# Patient Record
Sex: FEMALE | Race: WHITE | HISPANIC OR LATINO | Employment: STUDENT | URBAN - METROPOLITAN AREA
[De-identification: names, ages, dates, MRNs, and addresses within clinical notes are randomized per-mention and may not be internally consistent; named-entity substitution may affect disease eponyms.]

---

## 2018-01-24 ENCOUNTER — OFFICE VISIT (OUTPATIENT)
Dept: OBGYN CLINIC | Facility: MEDICAL CENTER | Age: 38
End: 2018-01-24
Payer: COMMERCIAL

## 2018-01-24 VITALS
HEIGHT: 62 IN | SYSTOLIC BLOOD PRESSURE: 122 MMHG | BODY MASS INDEX: 35.48 KG/M2 | DIASTOLIC BLOOD PRESSURE: 78 MMHG | WEIGHT: 192.8 LBS

## 2018-01-24 DIAGNOSIS — Z01.419 ENCOUNTER FOR ROUTINE GYNECOLOGICAL EXAMINATION WITH PAPANICOLAOU SMEAR OF CERVIX: Primary | ICD-10-CM

## 2018-01-24 DIAGNOSIS — Z11.51 SCREENING FOR HUMAN PAPILLOMAVIRUS (HPV): ICD-10-CM

## 2018-01-24 PROCEDURE — G0145 SCR C/V CYTO,THINLAYER,RESCR: HCPCS | Performed by: OBSTETRICS & GYNECOLOGY

## 2018-01-24 PROCEDURE — 87624 HPV HI-RISK TYP POOLED RSLT: CPT | Performed by: OBSTETRICS & GYNECOLOGY

## 2018-01-24 PROCEDURE — 99395 PREV VISIT EST AGE 18-39: CPT | Performed by: OBSTETRICS & GYNECOLOGY

## 2018-01-24 RX ORDER — ALPRAZOLAM 0.25 MG/1
TABLET ORAL
COMMUNITY
Start: 2017-12-11

## 2018-01-24 RX ORDER — IBUPROFEN 800 MG/1
800 TABLET ORAL EVERY 8 HOURS
COMMUNITY
Start: 2017-12-11

## 2018-01-24 RX ORDER — ESCITALOPRAM OXALATE 20 MG/1
20 TABLET ORAL
COMMUNITY
Start: 2017-12-11

## 2018-01-24 NOTE — PROGRESS NOTES
Assessment/Plan:    No problem-specific Assessment & Plan notes found for this encounter  Diagnoses and all orders for this visit:    Encounter for routine gynecological examination with Papanicolaou smear of cervix  -     Liquid-based pap, screening    Screening for human papillomavirus (HPV)  -     Liquid-based pap, screening    Other orders  -     ALPRAZolam (XANAX) 0 25 mg tablet; 1 po bid prn Anxiety  -     escitalopram (LEXAPRO) 20 mg tablet; Take 20 mg by mouth  -     ibuprofen (MOTRIN) 800 mg tablet; Take 800 mg by mouth every 8 (eight) hours          Subjective:      Patient ID: Kamilla Garcia is a 40 y o  female  Here for yearly preventive      Gynecologic Exam         The following portions of the patient's history were reviewed and updated as appropriate: She  has no past medical history on file       Review of Systems   All other systems reviewed and are negative  Objective:     Physical Exam   Constitutional: She appears well-developed and well-nourished  Nursing note and vitals reviewed

## 2018-01-29 LAB — HPV RRNA GENITAL QL NAA+PROBE: NORMAL

## 2018-01-30 LAB
LAB AP GYN PRIMARY INTERPRETATION: NORMAL
LAB AP LMP: NORMAL
Lab: NORMAL

## 2020-10-14 ENCOUNTER — TELEPHONE (OUTPATIENT)
Dept: OBGYN CLINIC | Facility: MEDICAL CENTER | Age: 40
End: 2020-10-14

## 2021-01-06 ENCOUNTER — TELEPHONE (OUTPATIENT)
Dept: OBGYN CLINIC | Facility: MEDICAL CENTER | Age: 41
End: 2021-01-06

## 2021-01-06 DIAGNOSIS — Z12.31 ENCOUNTER FOR SCREENING MAMMOGRAM FOR BREAST CANCER: Primary | ICD-10-CM

## 2021-01-20 ENCOUNTER — ANNUAL EXAM (OUTPATIENT)
Dept: OBGYN CLINIC | Facility: MEDICAL CENTER | Age: 41
End: 2021-01-20
Payer: COMMERCIAL

## 2021-01-20 VITALS
BODY MASS INDEX: 37.1 KG/M2 | SYSTOLIC BLOOD PRESSURE: 113 MMHG | DIASTOLIC BLOOD PRESSURE: 72 MMHG | WEIGHT: 201.6 LBS | HEIGHT: 62 IN

## 2021-01-20 DIAGNOSIS — Z01.419 ROUTINE GYNECOLOGICAL EXAMINATION: Primary | ICD-10-CM

## 2021-01-20 DIAGNOSIS — Z12.4 SCREENING FOR MALIGNANT NEOPLASM OF CERVIX: ICD-10-CM

## 2021-01-20 PROBLEM — E66.3 OVERWEIGHT: Status: ACTIVE | Noted: 2020-11-11

## 2021-01-20 PROBLEM — G89.29 CHRONIC RIGHT SHOULDER PAIN: Status: ACTIVE | Noted: 2020-01-07

## 2021-01-20 PROBLEM — L30.9 ECZEMA OF RIGHT HAND: Status: ACTIVE | Noted: 2018-11-07

## 2021-01-20 PROBLEM — M25.511 CHRONIC RIGHT SHOULDER PAIN: Status: ACTIVE | Noted: 2020-01-07

## 2021-01-20 PROCEDURE — 99396 PREV VISIT EST AGE 40-64: CPT | Performed by: ADVANCED PRACTICE MIDWIFE

## 2021-01-20 PROCEDURE — G0145 SCR C/V CYTO,THINLAYER,RESCR: HCPCS | Performed by: ADVANCED PRACTICE MIDWIFE

## 2021-01-20 PROCEDURE — 87624 HPV HI-RISK TYP POOLED RSLT: CPT | Performed by: ADVANCED PRACTICE MIDWIFE

## 2021-01-20 NOTE — PROGRESS NOTES
ASSESSMENT & PLAN: Marino Potts is a 36 y o  No obstetric history on file  with normal gynecologic exam     1   Routine well woman exam done today  2  Pap and HPV:  The patient's last pap and hpv was 2018  It was normal     Pap and cotesting was done today  Current ASCCP Guidelines reviewed  Reviewed with patient would like testing done today  3  Mammogram ordered  4  The following were reviewed in today's visit: breast self exam, mammography screening ordered, adequate intake of calcium and vitamin D and exercise      CC:  Annual Gynecologic Examination    HPI: Marino Potts is a 36 y o  No obstetric history on file  who presents for annual gynecologic examination  She has the following concerns:  None  Sleep pattern- good  Approximately 2-3 servings of calcium rich foods daily  Decreased activity due to Shoulder problems  2 servings of caffeine daily      Health Maintenance:    She wears her seatbelt routinely  She does perform regular monthly self breast exams  She feels safe at home  There is no problem list on file for this patient  No past medical history on file  No past surgical history on file  Past OB/Gyn History:  OB History    No obstetric history on file  Pt does not have menstrual issues     History of sexually transmitted infection: No   History of abnormal pap smears: No      Patient is currently sexually active  heterosexual  The current method of family planning is none      Family History   Problem Relation Age of Onset    Leukemia Son     Breast cancer Other        Social History:  Social History     Socioeconomic History    Marital status: /Civil Union     Spouse name: Not on file    Number of children: Not on file    Years of education: Not on file    Highest education level: Not on file   Occupational History    Not on file   Social Needs    Financial resource strain: Not on file    Food insecurity     Worry: Not on file     Inability: Not on file    Transportation needs     Medical: Not on file     Non-medical: Not on file   Tobacco Use    Smoking status: Never Smoker    Smokeless tobacco: Never Used   Substance and Sexual Activity    Alcohol use: Yes     Comment: rarely    Drug use: No    Sexual activity: Yes     Partners: Male     Birth control/protection: None   Lifestyle    Physical activity     Days per week: Not on file     Minutes per session: Not on file    Stress: Not on file   Relationships    Social connections     Talks on phone: Not on file     Gets together: Not on file     Attends Muslim service: Not on file     Active member of club or organization: Not on file     Attends meetings of clubs or organizations: Not on file     Relationship status: Not on file    Intimate partner violence     Fear of current or ex partner: Not on file     Emotionally abused: Not on file     Physically abused: Not on file     Forced sexual activity: Not on file   Other Topics Concern    Not on file   Social History Narrative    Not on file     Presently lives with   Patient is   No Known Allergies    Current Outpatient Medications:     ALPRAZolam (XANAX) 0 25 mg tablet, 1 po bid prn Anxiety, Disp: , Rfl:     escitalopram (LEXAPRO) 20 mg tablet, Take 20 mg by mouth, Disp: , Rfl:     ibuprofen (MOTRIN) 800 mg tablet, Take 800 mg by mouth every 8 (eight) hours, Disp: , Rfl:     Review of Systems:    Review of Systems  Constitutional :no fever, feels well, no tiredness, no recent weight gain or loss  ENT: no ear ache, no loss of hearing, no nosebleeds or nasal discharge, no sore throat or hoarseness  Cardiovascular: no complaints of slow or fast heart beat, no chest pain, no palpitations, no leg claudication or lower extremity edema    Respiratory: no complaints of shortness of shortness of breath, no HUERTA  Breasts:no complaints of breast pain, breast lump, or nipple discharge  Gastrointestinal: no complaints of abdominal pain, constipation, nausea, vomiting, or diarrhea or bloody stools  Genitourinary : no complaints of dysuria, incontinence, pelvic pain, no dysmenorrhea, vaginal discharge or abnormal vaginal bleeding and as noted in HPI  Musculoskeletal: no complaints of arthralgia, no myalgia, no joint swelling or stiffness, no limb pain or swelling  Integumentary: no complaints of skin rash or lesion, itching or dry skin  Neurological: no complaints of headache, no confusion, no numbness or tingling, no dizziness or fainting    Objective      There were no vitals taken for this visit  General:   appears stated age, cooperative, alert normal mood and affect   Neck: normal, supple,trachea midline, no masses   Heart: regular rate and rhythm, S1, S2 normal, no murmur, click, rub or gallop   Lungs: clear to auscultation bilaterally   Breasts: normal appearance, no masses or tenderness, No nipple retraction or dimpling, No nipple discharge or bleeding, No axillary or supraclavicular adenopathy   Abdomen: soft, non-tender, without masses or organomegaly   Vulva: normal female genitalia, Bartholin's, Urethra, [place] normal   Vagina: normal vagina, no discharge, exudate, lesion, or erythema   Urethra: normal   Cervix: Normal, no discharge  PAP done  Uterus: normal size, contour, position, consistency, mobility, non-tender   Adnexa: no mass, fullness, tenderness   Lymphatic palpation of lymph nodes in neck, axilla, groin and/or other locations: no lymphadenopathy or masses noted   Skin normal skin turgor and no rashes     Psychiatric orientation to person, place, and time: normal  mood and affect: normal

## 2021-01-24 LAB
LAB AP GYN PRIMARY INTERPRETATION: NORMAL
Lab: NORMAL

## 2021-01-25 LAB
HPV HR 12 DNA CVX QL NAA+PROBE: NEGATIVE
HPV16 DNA CVX QL NAA+PROBE: NEGATIVE
HPV18 DNA CVX QL NAA+PROBE: NEGATIVE

## 2021-05-04 ENCOUNTER — DOCUMENTATION (OUTPATIENT)
Dept: OBGYN CLINIC | Facility: MEDICAL CENTER | Age: 41
End: 2021-05-04

## 2021-12-20 ENCOUNTER — HOSPITAL ENCOUNTER (OUTPATIENT)
Dept: MAMMOGRAPHY | Facility: MEDICAL CENTER | Age: 41
Discharge: HOME | End: 2021-12-20
Payer: COMMERCIAL

## 2021-12-20 VITALS — BODY MASS INDEX: 37.08 KG/M2 | WEIGHT: 201.5 LBS | HEIGHT: 62 IN

## 2021-12-20 DIAGNOSIS — Z12.31 ENCOUNTER FOR SCREENING MAMMOGRAM FOR BREAST CANCER: ICD-10-CM

## 2021-12-20 PROCEDURE — 77067 SCR MAMMO BI INCL CAD: CPT

## 2021-12-20 PROCEDURE — 77063 BREAST TOMOSYNTHESIS BI: CPT

## 2023-04-05 ENCOUNTER — HOSPITAL ENCOUNTER (OUTPATIENT)
Dept: MAMMOGRAPHY | Facility: MEDICAL CENTER | Age: 43
Discharge: HOME | End: 2023-04-05

## 2023-04-05 VITALS — BODY MASS INDEX: 37 KG/M2 | HEIGHT: 62 IN | WEIGHT: 201.06 LBS

## 2023-04-05 DIAGNOSIS — Z12.31 ENCOUNTER FOR SCREENING MAMMOGRAM FOR BREAST CANCER: ICD-10-CM

## 2023-07-25 ENCOUNTER — TELEPHONE (OUTPATIENT)
Dept: OBGYN CLINIC | Facility: MEDICAL CENTER | Age: 43
End: 2023-07-25

## 2023-10-25 PROBLEM — M25.511 CHRONIC RIGHT SHOULDER PAIN: Status: RESOLVED | Noted: 2020-01-07 | Resolved: 2023-10-25

## 2023-10-25 PROBLEM — F51.01 PRIMARY INSOMNIA: Chronic | Status: ACTIVE | Noted: 2019-01-28

## 2023-10-25 PROBLEM — G89.29 CHRONIC RIGHT SHOULDER PAIN: Status: RESOLVED | Noted: 2020-01-07 | Resolved: 2023-10-25

## 2023-12-04 ENCOUNTER — TELEPHONE (OUTPATIENT)
Dept: OBGYN CLINIC | Facility: MEDICAL CENTER | Age: 43
End: 2023-12-04

## 2024-02-25 NOTE — PROGRESS NOTES
"Subjective      Joyce Tai is a 43 y.o. female who presents for annual well woman exam.  Last Pap smear 21 NILM/ HR HPV(-).  Last mammogram 23 birads 1. Periods are regular every 28-30 days, lasting  3-5  days. No intermenstrual bleeding, spotting, or discharge.     Patient is concerned with worsening urinary incontinence.  States she has had urinary incontinence for a long time has gotten worse over the past year.  Has had episodes of urinating through clothing when laughing, coughing, sneezing, can occur spontaneously and with urinary urgency.    Current contraception: none history of infertility  History of abnormal Pap smear: no  Family history of uterine or ovarian cancer: no  Regular self breast exam: no  History of abnormal mammogram: no  Family history of breast cancer: yes - Maternal Aunt (57y/o)  History of abnormal lipids: no  Gardasil vaccine: no      Menstrual History:  OB History          2    Para   2    Term   2            AB        Living   2         SAB        IAB        Ectopic        Multiple        Live Births               Obstetric Comments   Menarche at age 15              Menarche age: 15  No LMP recorded.  Period Cycle (Days):  (monthly)  Period Duration (Days): 3-5  Period Pattern: Regular  Menstrual Flow: Light, Moderate, Heavy (heavy for 2 days)  Menstrual Control: Tampon, Maxi pad  Menstrual Control Change Freq (Hours): heavy days 4-5 times a day  Dysmenorrhea: (!) Moderate (IBU as needed)  Dysmenorrhea Symptoms: Cramping    The following portions of the patient's history were reviewed and updated as appropriate: allergies, current medications, past family history, past medical history, past social history, past surgical history, and problem list.    Review of Systems  Pertinent items are noted in HPI.      Objective      /68   Ht 5' 4\" (1.626 m)   Wt 104 kg (228 lb 6.4 oz)   BMI 39.20 kg/m²     General:   alert and oriented, in no acute distress, " alert, appears stated age, and cooperative   Heart: regular rate and rhythm, S1, S2 normal, no murmur, click, rub or gallop   Lungs: clear to auscultation bilaterally   Abdomen: soft, non-tender, without masses or organomegaly   Vulva: normal, Bartholin's, Urethra, [place] normal   Vagina: normal mucosa, normal discharge, no palpable nodules   Cervix: no cervical motion tenderness and no lesions   Uterus: normal size, non-tender, normal shape and consistency   Adnexa: normal adnexa and no mass, fullness, tenderness   Breast: breasts appear normal, no suspicious masses, no skin or nipple changes or axillary nodes.              Assessment      @[email or web address]@ .     Plan      All questions answered.  Breast self exam technique reviewed and patient encouraged to perform self-exam monthly.  Contraception: none.  Diagnosis explained in detail, including differential.  Dietary diary.  Discussed healthy lifestyle modifications.  Educational material distributed.  Follow up in 1 year for annual exam.  Follow up as needed.  Mammogram.  Breast awareness reviewed   Urinary incontinence-referral placed for urogynecology and pelvic floor physical therapy.  Encouraged to schedule appointment  Encouraged healthy diet, exercise and lifestyle  Encouraged follow-up with PCP as needed  Encouraged seasonal influenza vaccination  Gardasil vaccine series reviewed.  Written information provided.  Encouraged social distancing, good hand hygiene, avoidance of crowds and masking.  Written information provided about COVID-19    Will call /Photocollectt message with results  VBI-    BMI Counseling: Body mass index is 39.2 kg/m². The BMI is above normal. Nutrition recommendations include 3-5 servings of fruits/vegetables daily. Exercise recommendations include exercising 3-5 times per week.

## 2024-02-27 ENCOUNTER — ANNUAL EXAM (OUTPATIENT)
Dept: OBGYN CLINIC | Facility: CLINIC | Age: 44
End: 2024-02-27
Payer: COMMERCIAL

## 2024-02-27 VITALS
DIASTOLIC BLOOD PRESSURE: 68 MMHG | HEIGHT: 64 IN | SYSTOLIC BLOOD PRESSURE: 110 MMHG | BODY MASS INDEX: 38.99 KG/M2 | WEIGHT: 228.4 LBS

## 2024-02-27 DIAGNOSIS — N39.46 MIXED STRESS AND URGE URINARY INCONTINENCE: ICD-10-CM

## 2024-02-27 DIAGNOSIS — Z01.419 WELL WOMAN EXAM WITH ROUTINE GYNECOLOGICAL EXAM: Primary | ICD-10-CM

## 2024-02-27 DIAGNOSIS — Z12.31 BREAST CANCER SCREENING BY MAMMOGRAM: ICD-10-CM

## 2024-02-27 PROBLEM — F51.01 PRIMARY INSOMNIA: Chronic | Status: RESOLVED | Noted: 2019-01-28 | Resolved: 2024-02-27

## 2024-02-27 PROCEDURE — S0612 ANNUAL GYNECOLOGICAL EXAMINA: HCPCS | Performed by: NURSE PRACTITIONER

## 2024-02-27 RX ORDER — IBUPROFEN 600 MG/1
TABLET ORAL
COMMUNITY
Start: 2024-01-18

## 2024-05-28 ENCOUNTER — HOSPITAL ENCOUNTER (INPATIENT)
Facility: HOSPITAL | Age: 44
LOS: 1 days | Discharge: HOME | DRG: 342 | End: 2024-05-29
Attending: EMERGENCY MEDICINE | Admitting: SURGERY
Payer: COMMERCIAL

## 2024-05-28 ENCOUNTER — APPOINTMENT (EMERGENCY)
Dept: CT IMAGING | Facility: HOSPITAL | Age: 44
DRG: 342 | End: 2024-05-28
Payer: COMMERCIAL

## 2024-05-28 DIAGNOSIS — K35.80 ACUTE APPENDICITIS: Primary | ICD-10-CM

## 2024-05-28 LAB
ALBUMIN SERPL BCP-MCNC: 4.2 G/DL (ref 3.5–5)
ALP SERPL-CCNC: 87 U/L (ref 34–104)
ALT SERPL W P-5'-P-CCNC: 20 U/L (ref 7–52)
ANION GAP SERPL CALCULATED.3IONS-SCNC: 8 MMOL/L (ref 4–13)
AST SERPL W P-5'-P-CCNC: 18 U/L (ref 13–39)
BACTERIA UR QL AUTO: ABNORMAL /HPF
BASOPHILS # BLD AUTO: 0.06 THOUSANDS/ÂΜL (ref 0–0.1)
BASOPHILS NFR BLD AUTO: 0 % (ref 0–1)
BILIRUB SERPL-MCNC: 0.34 MG/DL (ref 0.2–1)
BILIRUB UR QL STRIP: NEGATIVE
BUN SERPL-MCNC: 8 MG/DL (ref 5–25)
CALCIUM SERPL-MCNC: 8.8 MG/DL (ref 8.4–10.2)
CHLORIDE SERPL-SCNC: 103 MMOL/L (ref 96–108)
CLARITY UR: ABNORMAL
CO2 SERPL-SCNC: 23 MMOL/L (ref 21–32)
COLOR UR: ABNORMAL
CREAT SERPL-MCNC: 0.58 MG/DL (ref 0.6–1.3)
EOSINOPHIL # BLD AUTO: 0.06 THOUSAND/ÂΜL (ref 0–0.61)
EOSINOPHIL NFR BLD AUTO: 0 % (ref 0–6)
ERYTHROCYTE [DISTWIDTH] IN BLOOD BY AUTOMATED COUNT: 14.7 % (ref 11.6–15.1)
EXT PREGNANCY TEST URINE: NEGATIVE
EXT. CONTROL: NORMAL
GFR SERPL CREATININE-BSD FRML MDRD: 113 ML/MIN/1.73SQ M
GLUCOSE SERPL-MCNC: 89 MG/DL (ref 65–140)
GLUCOSE UR STRIP-MCNC: NEGATIVE MG/DL
HCT VFR BLD AUTO: 36.9 % (ref 34.8–46.1)
HGB BLD-MCNC: 12.3 G/DL (ref 11.5–15.4)
HGB UR QL STRIP.AUTO: ABNORMAL
IMM GRANULOCYTES # BLD AUTO: 0.08 THOUSAND/UL (ref 0–0.2)
IMM GRANULOCYTES NFR BLD AUTO: 0 % (ref 0–2)
KETONES UR STRIP-MCNC: NEGATIVE MG/DL
LEUKOCYTE ESTERASE UR QL STRIP: ABNORMAL
LIPASE SERPL-CCNC: 22 U/L (ref 11–82)
LYMPHOCYTES # BLD AUTO: 1.84 THOUSANDS/ÂΜL (ref 0.6–4.47)
LYMPHOCYTES NFR BLD AUTO: 10 % (ref 14–44)
MCH RBC QN AUTO: 27.5 PG (ref 26.8–34.3)
MCHC RBC AUTO-ENTMCNC: 33.3 G/DL (ref 31.4–37.4)
MCV RBC AUTO: 82 FL (ref 82–98)
MONOCYTES # BLD AUTO: 0.92 THOUSAND/ÂΜL (ref 0.17–1.22)
MONOCYTES NFR BLD AUTO: 5 % (ref 4–12)
MUCOUS THREADS UR QL AUTO: ABNORMAL
NEUTROPHILS # BLD AUTO: 15.19 THOUSANDS/ÂΜL (ref 1.85–7.62)
NEUTS SEG NFR BLD AUTO: 85 % (ref 43–75)
NITRITE UR QL STRIP: NEGATIVE
NON-SQ EPI CELLS URNS QL MICRO: ABNORMAL /HPF
NRBC BLD AUTO-RTO: 0 /100 WBCS
PH UR STRIP.AUTO: 6.5 [PH]
PLATELET # BLD AUTO: 322 THOUSANDS/UL (ref 149–390)
PMV BLD AUTO: 10.5 FL (ref 8.9–12.7)
POTASSIUM SERPL-SCNC: 3.5 MMOL/L (ref 3.5–5.3)
PROT SERPL-MCNC: 7.8 G/DL (ref 6.4–8.4)
PROT UR STRIP-MCNC: ABNORMAL MG/DL
RBC # BLD AUTO: 4.48 MILLION/UL (ref 3.81–5.12)
RBC #/AREA URNS AUTO: ABNORMAL /HPF
SODIUM SERPL-SCNC: 134 MMOL/L (ref 135–147)
SP GR UR STRIP.AUTO: 1.01 (ref 1–1.03)
UROBILINOGEN UR STRIP-ACNC: <2 MG/DL
WBC # BLD AUTO: 18.15 THOUSAND/UL (ref 4.31–10.16)
WBC #/AREA URNS AUTO: ABNORMAL /HPF

## 2024-05-28 PROCEDURE — 96376 TX/PRO/DX INJ SAME DRUG ADON: CPT

## 2024-05-28 PROCEDURE — 96375 TX/PRO/DX INJ NEW DRUG ADDON: CPT

## 2024-05-28 PROCEDURE — 36415 COLL VENOUS BLD VENIPUNCTURE: CPT

## 2024-05-28 PROCEDURE — 87086 URINE CULTURE/COLONY COUNT: CPT

## 2024-05-28 PROCEDURE — 74177 CT ABD & PELVIS W/CONTRAST: CPT

## 2024-05-28 PROCEDURE — 96374 THER/PROPH/DIAG INJ IV PUSH: CPT

## 2024-05-28 PROCEDURE — 85025 COMPLETE CBC W/AUTO DIFF WBC: CPT

## 2024-05-28 PROCEDURE — 99284 EMERGENCY DEPT VISIT MOD MDM: CPT

## 2024-05-28 PROCEDURE — 81025 URINE PREGNANCY TEST: CPT

## 2024-05-28 PROCEDURE — 96361 HYDRATE IV INFUSION ADD-ON: CPT

## 2024-05-28 PROCEDURE — 80053 COMPREHEN METABOLIC PANEL: CPT

## 2024-05-28 PROCEDURE — NC001 PR NO CHARGE: Performed by: SURGERY

## 2024-05-28 PROCEDURE — 99285 EMERGENCY DEPT VISIT HI MDM: CPT

## 2024-05-28 PROCEDURE — 81001 URINALYSIS AUTO W/SCOPE: CPT

## 2024-05-28 PROCEDURE — 83690 ASSAY OF LIPASE: CPT

## 2024-05-28 RX ORDER — ONDANSETRON 2 MG/ML
4 INJECTION INTRAMUSCULAR; INTRAVENOUS ONCE
Status: COMPLETED | OUTPATIENT
Start: 2024-05-28 | End: 2024-05-28

## 2024-05-28 RX ORDER — HYDROMORPHONE HCL/PF 1 MG/ML
1 SYRINGE (ML) INJECTION ONCE
Status: COMPLETED | OUTPATIENT
Start: 2024-05-28 | End: 2024-05-28

## 2024-05-28 RX ORDER — HYDROMORPHONE HCL/PF 1 MG/ML
1 SYRINGE (ML) INJECTION EVERY 4 HOURS PRN
Status: DISCONTINUED | OUTPATIENT
Start: 2024-05-28 | End: 2024-05-29

## 2024-05-28 RX ORDER — KETOROLAC TROMETHAMINE 30 MG/ML
15 INJECTION, SOLUTION INTRAMUSCULAR; INTRAVENOUS ONCE
Status: COMPLETED | OUTPATIENT
Start: 2024-05-28 | End: 2024-05-28

## 2024-05-28 RX ORDER — MORPHINE SULFATE 4 MG/ML
4 INJECTION, SOLUTION INTRAMUSCULAR; INTRAVENOUS ONCE
Status: COMPLETED | OUTPATIENT
Start: 2024-05-28 | End: 2024-05-28

## 2024-05-28 RX ORDER — ALPRAZOLAM 0.25 MG/1
0.25 TABLET ORAL 2 TIMES DAILY PRN
Status: DISCONTINUED | OUTPATIENT
Start: 2024-05-28 | End: 2024-05-29 | Stop reason: HOSPADM

## 2024-05-28 RX ORDER — HYDROMORPHONE HCL/PF 1 MG/ML
0.5 SYRINGE (ML) INJECTION
Status: DISCONTINUED | OUTPATIENT
Start: 2024-05-28 | End: 2024-05-29

## 2024-05-28 RX ORDER — HEPARIN SODIUM 5000 [USP'U]/ML
5000 INJECTION, SOLUTION INTRAVENOUS; SUBCUTANEOUS EVERY 8 HOURS SCHEDULED
Status: DISCONTINUED | OUTPATIENT
Start: 2024-05-28 | End: 2024-05-29 | Stop reason: HOSPADM

## 2024-05-28 RX ORDER — ACETAMINOPHEN 325 MG/1
975 TABLET ORAL EVERY 6 HOURS PRN
Status: DISCONTINUED | OUTPATIENT
Start: 2024-05-28 | End: 2024-05-29 | Stop reason: HOSPADM

## 2024-05-28 RX ORDER — OXYCODONE HYDROCHLORIDE 5 MG/1
5 TABLET ORAL EVERY 4 HOURS PRN
Status: DISCONTINUED | OUTPATIENT
Start: 2024-05-28 | End: 2024-05-28

## 2024-05-28 RX ORDER — HYDROMORPHONE HCL/PF 1 MG/ML
0.5 SYRINGE (ML) INJECTION
Status: DISCONTINUED | OUTPATIENT
Start: 2024-05-28 | End: 2024-05-28

## 2024-05-28 RX ORDER — METRONIDAZOLE 500 MG/100ML
500 INJECTION, SOLUTION INTRAVENOUS EVERY 8 HOURS
Status: DISCONTINUED | OUTPATIENT
Start: 2024-05-28 | End: 2024-05-29 | Stop reason: HOSPADM

## 2024-05-28 RX ORDER — ONDANSETRON 2 MG/ML
4 INJECTION INTRAMUSCULAR; INTRAVENOUS EVERY 6 HOURS PRN
Status: DISCONTINUED | OUTPATIENT
Start: 2024-05-28 | End: 2024-05-29 | Stop reason: HOSPADM

## 2024-05-28 RX ORDER — SODIUM CHLORIDE, SODIUM LACTATE, POTASSIUM CHLORIDE, CALCIUM CHLORIDE 600; 310; 30; 20 MG/100ML; MG/100ML; MG/100ML; MG/100ML
125 INJECTION, SOLUTION INTRAVENOUS CONTINUOUS
Status: DISCONTINUED | OUTPATIENT
Start: 2024-05-28 | End: 2024-05-29 | Stop reason: HOSPADM

## 2024-05-28 RX ORDER — ESCITALOPRAM OXALATE 20 MG/1
20 TABLET ORAL DAILY
Status: DISCONTINUED | OUTPATIENT
Start: 2024-05-29 | End: 2024-05-29 | Stop reason: HOSPADM

## 2024-05-28 RX ORDER — OXYCODONE HYDROCHLORIDE 10 MG/1
10 TABLET ORAL EVERY 4 HOURS PRN
Status: DISCONTINUED | OUTPATIENT
Start: 2024-05-28 | End: 2024-05-28

## 2024-05-28 RX ADMIN — ONDANSETRON 4 MG: 2 INJECTION INTRAMUSCULAR; INTRAVENOUS at 16:59

## 2024-05-28 RX ADMIN — SODIUM CHLORIDE 1000 ML: 0.9 INJECTION, SOLUTION INTRAVENOUS at 16:58

## 2024-05-28 RX ADMIN — KETOROLAC TROMETHAMINE 15 MG: 30 INJECTION, SOLUTION INTRAMUSCULAR at 16:53

## 2024-05-28 RX ADMIN — IOHEXOL 100 ML: 350 INJECTION, SOLUTION INTRAVENOUS at 20:33

## 2024-05-28 RX ADMIN — MORPHINE SULFATE 4 MG: 4 INJECTION INTRAVENOUS at 19:46

## 2024-05-28 RX ADMIN — HYDROMORPHONE HYDROCHLORIDE 0.5 MG: 0.5 INJECTION, SOLUTION INTRAMUSCULAR; INTRAVENOUS; SUBCUTANEOUS at 22:33

## 2024-05-28 RX ADMIN — HYDROMORPHONE HYDROCHLORIDE 1 MG: 1 INJECTION, SOLUTION INTRAMUSCULAR; INTRAVENOUS; SUBCUTANEOUS at 20:38

## 2024-05-28 RX ADMIN — MORPHINE SULFATE 4 MG: 4 INJECTION INTRAVENOUS at 17:35

## 2024-05-28 RX ADMIN — METRONIDAZOLE 500 MG: 500 INJECTION, SOLUTION INTRAVENOUS at 21:25

## 2024-05-28 RX ADMIN — SODIUM CHLORIDE, SODIUM LACTATE, POTASSIUM CHLORIDE, AND CALCIUM CHLORIDE 125 ML/HR: .6; .31; .03; .02 INJECTION, SOLUTION INTRAVENOUS at 21:22

## 2024-05-28 RX ADMIN — HEPARIN SODIUM 5000 UNITS: 5000 INJECTION INTRAVENOUS; SUBCUTANEOUS at 22:41

## 2024-05-28 RX ADMIN — DEXTROSE 1000 MG: 50 INJECTION, SOLUTION INTRAVENOUS at 22:41

## 2024-05-28 NOTE — ED PROVIDER NOTES
History  Chief Complaint   Patient presents with    Abdominal Pain     Pt woke up w RLQ abd pain, cramps, nausea, worse w movement, still has appendix      Patient is a 43-year-old female without past medical history resenting with right lower quadrant abdominal pain starting this morning.   she is currently on her.  And states she woke up with her normal cramps but then developed a new sharp pain in the right lower abdomen that is not typical for her periods.  Pain is progressively worsened throughout the day now hurts with any movement.  She has associated nausea.  No vomiting, urinary symptoms, diarrhea, constipation.  No prior abdominal surgeries.      Abdominal Pain  Associated symptoms: nausea    Associated symptoms: no chest pain, no chills, no constipation, no cough, no diarrhea, no dysuria, no fever, no hematuria, no shortness of breath, no sore throat and no vomiting        Prior to Admission Medications   Prescriptions Last Dose Informant Patient Reported? Taking?   ALPRAZolam (XANAX) 0.25 mg tablet   Yes No   Si po bid prn Anxiety   escitalopram (LEXAPRO) 20 mg tablet   Yes No   Sig: Take 20 mg by mouth   ibuprofen (MOTRIN) 600 mg tablet   Yes No      Facility-Administered Medications: None       Past Medical History:   Diagnosis Date    Vertigo        Past Surgical History:   Procedure Laterality Date    SHOULDER SURGERY Bilateral        Family History   Problem Relation Age of Onset    Hyperlipidemia Mother     Thyroid disease Mother     No Known Problems Father     No Known Problems Sister     No Known Problems Sister     No Known Problems Brother     No Known Problems Daughter     Leukemia Son 2    No Known Problems Maternal Grandmother     No Known Problems Maternal Grandfather     No Known Problems Paternal Grandmother     No Known Problems Paternal Grandfather     Breast cancer Maternal Aunt 58    No Known Problems Maternal Aunt     No Known Problems Maternal Aunt     No Known Problems  Maternal Aunt     No Known Problems Maternal Aunt     No Known Problems Maternal Aunt     No Known Problems Paternal Aunt     No Known Problems Paternal Aunt     Colon cancer Neg Hx     Ovarian cancer Neg Hx      I have reviewed and agree with the history as documented.    E-Cigarette/Vaping    E-Cigarette Use Never User      E-Cigarette/Vaping Substances    Nicotine No     THC No     CBD No     Flavoring No     Other No     Unknown No      Social History     Tobacco Use    Smoking status: Former    Smokeless tobacco: Never   Vaping Use    Vaping status: Never Used   Substance Use Topics    Alcohol use: Yes     Comment: rarely    Drug use: No       Review of Systems   Constitutional:  Negative for chills and fever.   HENT:  Negative for ear pain and sore throat.    Eyes:  Negative for pain and visual disturbance.   Respiratory:  Negative for cough and shortness of breath.    Cardiovascular:  Negative for chest pain and palpitations.   Gastrointestinal:  Positive for abdominal pain and nausea. Negative for constipation, diarrhea and vomiting.   Genitourinary:  Negative for dysuria, flank pain, frequency and hematuria.   Musculoskeletal:  Negative for arthralgias and back pain.   Skin:  Negative for color change and rash.   Neurological:  Negative for seizures and syncope.   All other systems reviewed and are negative.      Physical Exam  Physical Exam  Vitals and nursing note reviewed.   Constitutional:       General: She is not in acute distress.     Appearance: Normal appearance.   HENT:      Head: Normocephalic and atraumatic.      Right Ear: External ear normal.      Left Ear: External ear normal.      Nose: Nose normal.   Eyes:      General: No scleral icterus.        Right eye: No discharge.         Left eye: No discharge.   Cardiovascular:      Rate and Rhythm: Normal rate.      Pulses: Normal pulses.   Pulmonary:      Effort: Pulmonary effort is normal. No respiratory distress.   Abdominal:      Palpations:  Abdomen is soft.      Tenderness: There is abdominal tenderness in the right upper quadrant and right lower quadrant. There is guarding. There is no right CVA tenderness or left CVA tenderness. Positive signs include Rovsing's sign.   Musculoskeletal:         General: No tenderness, deformity or signs of injury.      Cervical back: Normal range of motion and neck supple.   Skin:     General: Skin is dry.      Coloration: Skin is not jaundiced.      Findings: No erythema or rash.   Neurological:      General: No focal deficit present.      Mental Status: She is alert and oriented to person, place, and time. Mental status is at baseline.      Motor: No weakness.      Gait: Gait normal.   Psychiatric:         Mood and Affect: Mood normal.         Behavior: Behavior normal.         Thought Content: Thought content normal.         Vital Signs  ED Triage Vitals   Temperature Pulse Respirations Blood Pressure SpO2   05/28/24 1619 05/28/24 1619 05/28/24 1619 05/28/24 1619 05/28/24 1619   98.1 °F (36.7 °C) 84 22 116/58 100 %      Temp Source Heart Rate Source Patient Position - Orthostatic VS BP Location FiO2 (%)   05/28/24 1619 05/28/24 1619 05/28/24 1619 05/28/24 1619 --   Oral Monitor Sitting Right arm       Pain Score       05/28/24 1653       10 - Worst Possible Pain           Vitals:    05/28/24 1619 05/28/24 1721 05/28/24 1910 05/28/24 2148   BP: 116/58 123/67 124/58 126/73   Pulse: 84 81 83 90   Patient Position - Orthostatic VS: Sitting Lying Lying Lying         Visual Acuity      ED Medications  Medications   ceftriaxone (ROCEPHIN) 1 g/50 mL in dextrose IVPB (has no administration in time range)   metroNIDAZOLE (FLAGYL) IVPB (premix) 500 mg 100 mL (500 mg Intravenous New Bag 5/28/24 2125)   ALPRAZolam (XANAX) tablet 0.25 mg (has no administration in time range)   escitalopram (LEXAPRO) tablet 20 mg (has no administration in time range)   lactated ringers infusion (125 mL/hr Intravenous New Bag 5/28/24 2122)    ondansetron (ZOFRAN) injection 4 mg (has no administration in time range)   heparin (porcine) subcutaneous injection 5,000 Units (has no administration in time range)   oxyCODONE (ROXICODONE) IR tablet 5 mg (has no administration in time range)   oxyCODONE (ROXICODONE) immediate release tablet 10 mg (has no administration in time range)   HYDROmorphone (DILAUDID) injection 0.5 mg (has no administration in time range)   acetaminophen (TYLENOL) tablet 975 mg (has no administration in time range)   sodium chloride 0.9 % bolus 1,000 mL (0 mL Intravenous Stopped 5/28/24 2126)   ondansetron (ZOFRAN) injection 4 mg (4 mg Intravenous Given 5/28/24 1659)   ketorolac (TORADOL) injection 15 mg (15 mg Intravenous Given 5/28/24 1653)   morphine injection 4 mg (4 mg Intravenous Given 5/28/24 1735)   morphine injection 4 mg (4 mg Intravenous Given 5/28/24 1946)   HYDROmorphone (DILAUDID) injection 1 mg (1 mg Intravenous Given 5/28/24 2038)   iohexol (OMNIPAQUE) 350 MG/ML injection (MULTI-DOSE) 100 mL (100 mL Intravenous Given 5/28/24 2033)       Diagnostic Studies  Results Reviewed       Procedure Component Value Units Date/Time    Urine Microscopic [[id number]]  (Abnormal) Collected: 05/28/24 1951    Lab Status: Final result Specimen: Urine, Clean Catch Updated: 05/28/24 2100     RBC, UA Innumerable /hpf      WBC, UA Innumerable /hpf      Epithelial Cells Occasional /hpf      Bacteria, UA Occasional /hpf      MUCUS THREADS Moderate    Urine culture [[id number]] Collected: 05/28/24 1951    Lab Status: In process Specimen: Urine, Clean Catch Updated: 05/28/24 2100    UA w Reflex to Microscopic w Reflex to Culture [[id number]]  (Abnormal) Collected: 05/28/24 1951    Lab Status: Final result Specimen: Urine, Clean Catch Updated: 05/28/24 2035     Color, UA Brown     Clarity, UA Turbid     Specific Gravity, UA 1.012     pH, UA 6.5     Leukocytes, UA Large     Nitrite, UA Negative     Protein, UA 30 (1+) mg/dl      Glucose, UA Negative  mg/dl      Ketones, UA Negative mg/dl      Urobilinogen, UA <2.0 mg/dl      Bilirubin, UA Negative     Occult Blood, UA Large    POCT pregnancy, urine [[id number]]  (Normal) Resulted: 05/28/24 1954    Lab Status: Final result Updated: 05/28/24 1954     EXT Preg Test, Ur Negative     Control Valid    Comprehensive metabolic panel [[id number]]  (Abnormal) Collected: 05/28/24 1652    Lab Status: Final result Specimen: Blood from Arm, Right Updated: 05/28/24 1723     Sodium 134 mmol/L      Potassium 3.5 mmol/L      Chloride 103 mmol/L      CO2 23 mmol/L      ANION GAP 8 mmol/L      BUN 8 mg/dL      Creatinine 0.58 mg/dL      Glucose 89 mg/dL      Calcium 8.8 mg/dL      AST 18 U/L      ALT 20 U/L      Alkaline Phosphatase 87 U/L      Total Protein 7.8 g/dL      Albumin 4.2 g/dL      Total Bilirubin 0.34 mg/dL      eGFR 113 ml/min/1.73sq m     Narrative:      [employer] Kid[employer] guidelines for Chronic Kidney Disease (CKD):     Stage 1 with normal or high GFR (GFR > 90 mL/min/1.73 square meters)    Stage 2 Mild CKD (GFR = 60-89 mL/min/1.73 square meters)    Stage 3A Moderate CKD (GFR = 45-59 mL/min/1.73 square meters)    Stage 3B Moderate CKD (GFR = 30-44 mL/min/1.73 square meters)    Stage 4 Severe CKD (GFR = 15-29 mL/min/1.73 square meters)    Stage 5 End Stage CKD (GFR <15 mL/min/1.73 square meters)  Note: GFR calculation is accurate only with a steady state creatinine    Lipase [[id number]]  (Normal) Collected: 05/28/24 1652    Lab Status: Final result Specimen: Blood from Arm, Right Updated: 05/28/24 1723     Lipase 22 u/L     CBC and differential [[id number]]  (Abnormal) Collected: 05/28/24 1652    Lab Status: Final result Specimen: Blood from Arm, Right Updated: 05/28/24 1706     WBC 18.15 Thousand/uL      RBC 4.48 Million/uL      Hemoglobin 12.3 g/dL      Hematocrit 36.9 %      MCV 82 fL      MCH 27.5 pg      MCHC 33.3 g/dL      RDW 14.7 %      MPV 10.5 fL      Platelets 322 Thousands/uL      nRBC 0 /100  WBCs      Segmented % 85 %      Immature Grans % 0 %      Lymphocytes % 10 %      Monocytes % 5 %      Eosinophils Relative 0 %      Basophils Relative 0 %      Absolute Neutrophils 15.19 Thousands/µL      Absolute Immature Grans 0.08 Thousand/uL      Absolute Lymphocytes 1.84 Thousands/µL      Absolute Monocytes 0.92 Thousand/µL      Eosinophils Absolute 0.06 Thousand/µL      Basophils Absolute 0.06 Thousands/µL                    CT abdomen pelvis w contrast   Final Result by Yovani Mireles MD (05/28 2044)      Findings of acute appendicitis.      pI personally discussed this study with JOSE L CAMPOVERDE on 5/28/2024 8:42 PM.                  Workstation performed: [id number]                    Procedures  Procedures         ED Course  ED Course as of 05/28/24 2206   Tue May 28, 2024   1707 WBC(!): 18.15   2039 Surgery evaluated patient at bedside and reviewed scan - likely appendicitis   2043 D/w radiologist Dr. Mireles. Rocephin and flagyl ordered and surgery accepted inpatient under Dr. Wray.                               SBIRT 20yo+      Flowsheet Row Most Recent Value   Initial Alcohol Screen: US AUDIT-C     1. How often do you have a drink containing alcohol? 0 Filed at: 05/28/2024 1619   2. How many drinks containing alcohol do you have on a typical day you are drinking?  0 Filed at: 05/28/2024 1619   3b. FEMALE Any Age, or MALE 65+: How often do you have 4 or more drinks on one occassion? 0 Filed at: 05/28/2024 1619   Audit-C Score 0 Filed at: 05/28/2024 1619   LIBIA: How many times in the past year have you...    Used an illegal drug or used a prescription medication for non-medical reasons? Never Filed at: 05/28/2024 1619                      Medical Decision Making  Patient is a 43-year-old female presenting with right lower quadrant abdominal pain and nausea for 1 day.  Vitals are stable and she is in significant pain on arrival.  DDx: Appendicitis, cholecystitis, ovarian pathology,  diverticulitis, cystitis, nephrolithiasis.  Plan: CBC, CMP, lipase, UA, urine pregnancy, CT abdomen.  Will treat symptomatically fluids, Zofran, pain meds.    Leukocytosis present with WBC 18,000.  No anemia, significant extremities, HARRISON.  Lipase negative.  UA has large leukocytes and blood however patient is on her menses at this time.  CT shows acute appendicitis. Rocephin/flagyl ordered by surgery resident.    Discussed with surgery resident Dr. Pierce evaluated patient at bedside.  Accepted to surgery service under Dr. Wray. I have discussed findings and plan for discharge with the patient/caregiver. Follow up with the appropriate providers including primary care physician was discussed. Return precautions discussed with patient/caregiver as outlined in AVS. Patient/caregiver verbally expressed understanding. Patient stable at time of discharge and ambulated out of the emergency department.       Amount and/or Complexity of Data Reviewed  Labs: ordered. Decision-making details documented in ED Course.  Radiology: ordered.    Risk  Prescription drug management.  Decision regarding hospitalization.             Disposition  Final diagnoses:   Acute appendicitis     Time reflects when diagnosis was documented in both MDM as applicable and the Disposition within this note       Time User Action Codes Description Comment    5/28/2024  8:43 PM Perla Coleman Add [K35.80] Acute appendicitis           ED Disposition       ED Disposition   Admit    Condition   Stable    Date/Time   Tue May 28, 2024 2043    Comment   Case was discussed with surgery and the patient's admission status was agreed to be Admission Status: inpatient status to the service of Dr. Wray .               Follow-up Information    None         Current Discharge Medication List        CONTINUE these medications which have NOT CHANGED    Details   ALPRAZolam (XANAX) 0.25 mg tablet 1 po bid prn Anxiety      escitalopram (LEXAPRO) 20 mg tablet Take 20  mg by mouth      ibuprofen (MOTRIN) 600 mg tablet              No discharge procedures on file.    PDMP Review       None            ED Provider  Electronically Signed by             Perla Coleman PA-C  05/28/24 8761

## 2024-05-29 ENCOUNTER — ANESTHESIA (INPATIENT)
Dept: PERIOP | Facility: HOSPITAL | Age: 44
DRG: 342 | End: 2024-05-29
Payer: COMMERCIAL

## 2024-05-29 ENCOUNTER — ANESTHESIA EVENT (INPATIENT)
Dept: PERIOP | Facility: HOSPITAL | Age: 44
DRG: 342 | End: 2024-05-29
Payer: COMMERCIAL

## 2024-05-29 VITALS
HEART RATE: 88 BPM | HEIGHT: 61 IN | SYSTOLIC BLOOD PRESSURE: 124 MMHG | TEMPERATURE: 98.3 F | WEIGHT: 231.04 LBS | DIASTOLIC BLOOD PRESSURE: 73 MMHG | OXYGEN SATURATION: 95 % | BODY MASS INDEX: 43.62 KG/M2 | RESPIRATION RATE: 16 BRPM

## 2024-05-29 PROBLEM — K35.80 ACUTE APPENDICITIS: Status: ACTIVE | Noted: 2024-05-29

## 2024-05-29 PROBLEM — K35.80 ACUTE APPENDICITIS: Status: RESOLVED | Noted: 2024-05-29 | Resolved: 2024-05-29

## 2024-05-29 LAB
BASOPHILS # BLD AUTO: 0.06 THOUSANDS/ÂΜL (ref 0–0.1)
BASOPHILS NFR BLD AUTO: 0 % (ref 0–1)
EOSINOPHIL # BLD AUTO: 0.06 THOUSAND/ÂΜL (ref 0–0.61)
EOSINOPHIL NFR BLD AUTO: 0 % (ref 0–6)
ERYTHROCYTE [DISTWIDTH] IN BLOOD BY AUTOMATED COUNT: 14.8 % (ref 11.6–15.1)
HCT VFR BLD AUTO: 34.5 % (ref 34.8–46.1)
HGB BLD-MCNC: 11.1 G/DL (ref 11.5–15.4)
IMM GRANULOCYTES # BLD AUTO: 0.07 THOUSAND/UL (ref 0–0.2)
IMM GRANULOCYTES NFR BLD AUTO: 1 % (ref 0–2)
LYMPHOCYTES # BLD AUTO: 1.77 THOUSANDS/ÂΜL (ref 0.6–4.47)
LYMPHOCYTES NFR BLD AUTO: 12 % (ref 14–44)
MCH RBC QN AUTO: 26.7 PG (ref 26.8–34.3)
MCHC RBC AUTO-ENTMCNC: 32.2 G/DL (ref 31.4–37.4)
MCV RBC AUTO: 83 FL (ref 82–98)
MONOCYTES # BLD AUTO: 0.73 THOUSAND/ÂΜL (ref 0.17–1.22)
MONOCYTES NFR BLD AUTO: 5 % (ref 4–12)
NEUTROPHILS # BLD AUTO: 12.42 THOUSANDS/ÂΜL (ref 1.85–7.62)
NEUTS SEG NFR BLD AUTO: 82 % (ref 43–75)
NRBC BLD AUTO-RTO: 0 /100 WBCS
PLATELET # BLD AUTO: 279 THOUSANDS/UL (ref 149–390)
PMV BLD AUTO: 10.6 FL (ref 8.9–12.7)
RBC # BLD AUTO: 4.16 MILLION/UL (ref 3.81–5.12)
WBC # BLD AUTO: 15.11 THOUSAND/UL (ref 4.31–10.16)

## 2024-05-29 PROCEDURE — 0DTJ4ZZ RESECTION OF APPENDIX, PERCUTANEOUS ENDOSCOPIC APPROACH: ICD-10-PCS | Performed by: SURGERY

## 2024-05-29 PROCEDURE — 85025 COMPLETE CBC W/AUTO DIFF WBC: CPT

## 2024-05-29 PROCEDURE — 88304 TISSUE EXAM BY PATHOLOGIST: CPT | Performed by: PATHOLOGY

## 2024-05-29 PROCEDURE — 44970 LAPAROSCOPY APPENDECTOMY: CPT | Performed by: SURGERY

## 2024-05-29 PROCEDURE — NC001 PR NO CHARGE: Performed by: SURGERY

## 2024-05-29 RX ORDER — MIDAZOLAM HYDROCHLORIDE 2 MG/2ML
INJECTION, SOLUTION INTRAMUSCULAR; INTRAVENOUS AS NEEDED
Status: DISCONTINUED | OUTPATIENT
Start: 2024-05-29 | End: 2024-05-29

## 2024-05-29 RX ORDER — ONDANSETRON 2 MG/ML
INJECTION INTRAMUSCULAR; INTRAVENOUS AS NEEDED
Status: DISCONTINUED | OUTPATIENT
Start: 2024-05-29 | End: 2024-05-29

## 2024-05-29 RX ORDER — DEXAMETHASONE SODIUM PHOSPHATE 10 MG/ML
INJECTION, SOLUTION INTRAMUSCULAR; INTRAVENOUS AS NEEDED
Status: DISCONTINUED | OUTPATIENT
Start: 2024-05-29 | End: 2024-05-29

## 2024-05-29 RX ORDER — HYDROMORPHONE HCL/PF 1 MG/ML
0.5 SYRINGE (ML) INJECTION ONCE
Status: COMPLETED | OUTPATIENT
Start: 2024-05-29 | End: 2024-05-29

## 2024-05-29 RX ORDER — HYDROCODONE BITARTRATE AND ACETAMINOPHEN 5; 325 MG/1; MG/1
1 TABLET ORAL EVERY 6 HOURS PRN
Qty: 6 TABLET | Refills: 0 | Status: SHIPPED | OUTPATIENT
Start: 2024-05-29 | End: 2024-06-06 | Stop reason: ALTCHOICE

## 2024-05-29 RX ORDER — HYDROMORPHONE HCL/PF 1 MG/ML
1 SYRINGE (ML) INJECTION
Status: DISCONTINUED | OUTPATIENT
Start: 2024-05-29 | End: 2024-05-29

## 2024-05-29 RX ORDER — SUCCINYLCHOLINE/SOD CL,ISO/PF 100 MG/5ML
SYRINGE (ML) INTRAVENOUS AS NEEDED
Status: DISCONTINUED | OUTPATIENT
Start: 2024-05-29 | End: 2024-05-29

## 2024-05-29 RX ORDER — FENTANYL CITRATE/PF 50 MCG/ML
50 SYRINGE (ML) INJECTION
Status: DISCONTINUED | OUTPATIENT
Start: 2024-05-29 | End: 2024-05-29 | Stop reason: HOSPADM

## 2024-05-29 RX ORDER — FENTANYL CITRATE 50 UG/ML
INJECTION, SOLUTION INTRAMUSCULAR; INTRAVENOUS AS NEEDED
Status: DISCONTINUED | OUTPATIENT
Start: 2024-05-29 | End: 2024-05-29

## 2024-05-29 RX ORDER — FENTANYL CITRATE 50 UG/ML
50 INJECTION, SOLUTION INTRAMUSCULAR; INTRAVENOUS ONCE
Status: DISCONTINUED | OUTPATIENT
Start: 2024-05-29 | End: 2024-05-29

## 2024-05-29 RX ORDER — ROCURONIUM BROMIDE 10 MG/ML
INJECTION, SOLUTION INTRAVENOUS AS NEEDED
Status: DISCONTINUED | OUTPATIENT
Start: 2024-05-29 | End: 2024-05-29

## 2024-05-29 RX ORDER — CEFAZOLIN SODIUM 1 G/3ML
INJECTION, POWDER, FOR SOLUTION INTRAMUSCULAR; INTRAVENOUS AS NEEDED
Status: DISCONTINUED | OUTPATIENT
Start: 2024-05-29 | End: 2024-05-29

## 2024-05-29 RX ORDER — LORAZEPAM 2 MG/ML
0.5 INJECTION INTRAMUSCULAR ONCE
Status: COMPLETED | OUTPATIENT
Start: 2024-05-29 | End: 2024-05-29

## 2024-05-29 RX ORDER — ONDANSETRON 2 MG/ML
4 INJECTION INTRAMUSCULAR; INTRAVENOUS ONCE AS NEEDED
Status: DISCONTINUED | OUTPATIENT
Start: 2024-05-29 | End: 2024-05-29 | Stop reason: HOSPADM

## 2024-05-29 RX ORDER — PROPOFOL 10 MG/ML
INJECTION, EMULSION INTRAVENOUS AS NEEDED
Status: DISCONTINUED | OUTPATIENT
Start: 2024-05-29 | End: 2024-05-29

## 2024-05-29 RX ORDER — LIDOCAINE HYDROCHLORIDE 10 MG/ML
INJECTION, SOLUTION EPIDURAL; INFILTRATION; INTRACAUDAL; PERINEURAL AS NEEDED
Status: DISCONTINUED | OUTPATIENT
Start: 2024-05-29 | End: 2024-05-29

## 2024-05-29 RX ORDER — METHOCARBAMOL 500 MG/1
500 TABLET, FILM COATED ORAL EVERY 6 HOURS PRN
Status: DISCONTINUED | OUTPATIENT
Start: 2024-05-29 | End: 2024-05-29 | Stop reason: HOSPADM

## 2024-05-29 RX ORDER — HYDROMORPHONE HCL/PF 1 MG/ML
0.5 SYRINGE (ML) INJECTION
Status: DISCONTINUED | OUTPATIENT
Start: 2024-05-29 | End: 2024-05-29 | Stop reason: HOSPADM

## 2024-05-29 RX ADMIN — Medication 200 MG: at 13:34

## 2024-05-29 RX ADMIN — HYDROMORPHONE HYDROCHLORIDE 0.5 MG: 0.5 INJECTION, SOLUTION INTRAMUSCULAR; INTRAVENOUS; SUBCUTANEOUS at 01:35

## 2024-05-29 RX ADMIN — SUGAMMADEX 400 MG: 100 INJECTION, SOLUTION INTRAVENOUS at 14:15

## 2024-05-29 RX ADMIN — HYDROMORPHONE HYDROCHLORIDE 1 MG: 1 INJECTION, SOLUTION INTRAMUSCULAR; INTRAVENOUS; SUBCUTANEOUS at 12:11

## 2024-05-29 RX ADMIN — LIDOCAINE HYDROCHLORIDE 100 MG: 10 INJECTION, SOLUTION EPIDURAL; INFILTRATION; INTRACAUDAL; PERINEURAL at 13:34

## 2024-05-29 RX ADMIN — SODIUM CHLORIDE, SODIUM LACTATE, POTASSIUM CHLORIDE, AND CALCIUM CHLORIDE 125 ML/HR: .6; .31; .03; .02 INJECTION, SOLUTION INTRAVENOUS at 08:00

## 2024-05-29 RX ADMIN — SODIUM CHLORIDE, SODIUM LACTATE, POTASSIUM CHLORIDE, AND CALCIUM CHLORIDE: .6; .31; .03; .02 INJECTION, SOLUTION INTRAVENOUS at 14:11

## 2024-05-29 RX ADMIN — METRONIDAZOLE 500 MG: 500 INJECTION, SOLUTION INTRAVENOUS at 12:16

## 2024-05-29 RX ADMIN — FENTANYL CITRATE 100 MCG: 50 INJECTION INTRAMUSCULAR; INTRAVENOUS at 13:34

## 2024-05-29 RX ADMIN — HYDROMORPHONE HYDROCHLORIDE 1 MG: 1 INJECTION, SOLUTION INTRAMUSCULAR; INTRAVENOUS; SUBCUTANEOUS at 11:28

## 2024-05-29 RX ADMIN — FENTANYL CITRATE 50 MCG: 50 INJECTION INTRAMUSCULAR; INTRAVENOUS at 15:45

## 2024-05-29 RX ADMIN — HYDROMORPHONE HYDROCHLORIDE 1 MG: 1 INJECTION, SOLUTION INTRAMUSCULAR; INTRAVENOUS; SUBCUTANEOUS at 04:10

## 2024-05-29 RX ADMIN — ACETAMINOPHEN 975 MG: 325 TABLET, FILM COATED ORAL at 06:12

## 2024-05-29 RX ADMIN — ROCURONIUM BROMIDE 50 MG: 10 INJECTION, SOLUTION INTRAVENOUS at 13:42

## 2024-05-29 RX ADMIN — LORAZEPAM 0.5 MG: 2 INJECTION INTRAMUSCULAR; INTRAVENOUS at 06:08

## 2024-05-29 RX ADMIN — METHOCARBAMOL 500 MG: 500 TABLET ORAL at 18:32

## 2024-05-29 RX ADMIN — ONDANSETRON 4 MG: 2 INJECTION INTRAMUSCULAR; INTRAVENOUS at 14:05

## 2024-05-29 RX ADMIN — DEXAMETHASONE SODIUM PHOSPHATE 10 MG: 10 INJECTION INTRAMUSCULAR; INTRAVENOUS at 14:04

## 2024-05-29 RX ADMIN — ACETAMINOPHEN 975 MG: 325 TABLET, FILM COATED ORAL at 17:41

## 2024-05-29 RX ADMIN — HYDROMORPHONE HYDROCHLORIDE 0.5 MG: 0.5 INJECTION, SOLUTION INTRAMUSCULAR; INTRAVENOUS; SUBCUTANEOUS at 06:08

## 2024-05-29 RX ADMIN — METRONIDAZOLE 500 MG: 500 INJECTION, SOLUTION INTRAVENOUS at 04:11

## 2024-05-29 RX ADMIN — CEFAZOLIN 2000 MG: 1 INJECTION, POWDER, FOR SOLUTION INTRAMUSCULAR; INTRAVENOUS; PARENTERAL at 13:45

## 2024-05-29 RX ADMIN — HYDROMORPHONE HYDROCHLORIDE 1 MG: 1 INJECTION, SOLUTION INTRAMUSCULAR; INTRAVENOUS; SUBCUTANEOUS at 07:16

## 2024-05-29 RX ADMIN — HYDROMORPHONE HYDROCHLORIDE 0.5 MG: 0.5 INJECTION, SOLUTION INTRAMUSCULAR; INTRAVENOUS; SUBCUTANEOUS at 05:52

## 2024-05-29 RX ADMIN — HEPARIN SODIUM 5000 UNITS: 5000 INJECTION INTRAVENOUS; SUBCUTANEOUS at 05:52

## 2024-05-29 RX ADMIN — PROPOFOL 200 MG: 10 INJECTION, EMULSION INTRAVENOUS at 13:34

## 2024-05-29 NOTE — PROGRESS NOTES
"Progress Note - General Surgery   Joyce Tai 43 y.o. female MRN: [id number]  Unit/Bed#: [address] 202-01 Encounter: [id number]    Assessment:  42yo F with acute appendicitis    Plan:  - NPO for OR  - rocephin/flagyl  - OR today for lap appy  - pain and nausea control  - anticipate discharge home within 24-48 hours    Subjective/Objective   Subjective:   No acute events overnight. Still having significant abdominal pain.     Objective:     Blood pressure 127/73, pulse 92, temperature 99.9 °F (37.7 °C), resp. rate 16, height 5' 1\" (1.549 m), weight 105 kg (231 lb 0.7 oz), SpO2 91%.,Body mass index is 43.65 kg/m².      Intake/Output Summary (Last 24 hours) at 5/29/2024 0807  Last data filed at 5/28/2024 2315  Gross per 24 hour   Intake 1240 ml   Output --   Net 1240 ml       Invasive Devices       Peripheral Intravenous Line  Duration             Peripheral IV 05/28/24 Right Antecubital <1 day                    Physical Exam:  General: No acute distress  Neuro: alert and oriented  HEENT: moist mucous membranes  CV: Well perfused, regular rate and rhythm  Lungs: Normal work of breathing, no increased respiratory effort  Abdomen: Soft, tender, non-distended.  Extremities: No edema, clubbing or cyanosis  Skin: Warm, dry      Rola Pierce MD  General Surgery PGY2    "

## 2024-05-29 NOTE — MALNUTRITION/BMI
This medical record reflects one or more clinical indicators suggestive of malnutrition and/or morbid obesity.                                    BMI Findings:  Adult BMI Classifications: Morbid Obesity 40-44.9        Body mass index is 43.65 kg/m².

## 2024-05-29 NOTE — ANESTHESIA PREPROCEDURE EVALUATION
Procedure:  APPENDECTOMY LAPAROSCOPIC (Abdomen)    Relevant Problems   ENDO  Morbid obesity      NEURO/PSYCH   (+) Anxiety state   (+) Panic disorder        Physical Exam    Airway    Mallampati score: II  TM Distance: >3 FB       Dental   No notable dental hx     Cardiovascular  Cardiovascular exam normal    Pulmonary  Pulmonary exam normal     Other Findings  post-pubertal.      Anesthesia Plan  ASA Score- 3     Anesthesia Type- general with ASA Monitors.         Additional Monitors:     Airway Plan: ETT.           Plan Factors-Exercise tolerance (METS): >4 METS.    Chart reviewed.        Patient is not a current smoker.  Patient instructed to abstain from smoking on day of procedure. Patient did not smoke on day of surgery.            Induction- intravenous.    Postoperative Plan- Plan for postoperative opioid use. Planned trial extubation    Perioperative Resuscitation Plan - Level 1 - Full Code.       Informed Consent- Anesthetic plan and risks discussed with patient.

## 2024-05-29 NOTE — OP NOTE
APPENDECTOMY LAPAROSCOPIC  Postoperative Note  PATIENT NAME: Joyce Tai  : 1980  MRN: [id number]  [facility] OR ROOM 04    Surgery Date: 2024    Pre operative diagnosis:  Acute appendicitis [K35.80]    Operative Indications:  Acute Appendicitis       Consent:  The risks, benefits, and alternatives to the surgery were discussed with the patient and/or with the family prior to surgery, personally by Dr. Galindo.  If the consent was obtained by the physician assistant or other representative, the consent was reviewed once again personally by the operating physician.  Common complications particular for this procedure as well as unusual complications were discussed, including but not limited to:  bleeding, wound infection, prolonged wound healing, open wounds, reoperation, leak from the bowel or viscus, leak from the bile duct or injury to adjacent or other organs or blood vessels in the abdomen. If the surgery was laparoscopic, it was discussed that possible open surgery could also occur during that same surgery and is always an option in laparoscopic surgery and possible reoperation. A  was used if necessary.  The patient expressed understanding of the issues discussed and wished and consented to the procedure to proceed.  All questions were answered.  Dr. Galindo personally discussed the informed consent with this patient.      Operative Findings:  Acute suppurative appendicitis     Post operative diagnosis:   Post-Op Diagnosis Codes:     * Acute appendicitis [K35.80]    Procedure:   Procedure(s):  APPENDECTOMY LAPAROSCOPIC    Surgeons and Role:     * Antoinette Galindo MD - Primary     * Salena Zambrano MD - Assisting       The assistant was medically necessary for surgical safety the case including suturing, retraction, and hemostasis. A qualified resident was available.  I provided direct and immediate supervision.  I was present for the entire procedure.     Drains:  [REMOVED] NG/OG/Enteral  Tube Orogastric 18 Fr Right mouth (Removed)   Number of days: 0       Specimens:  ID Type Source Tests Collected by Time Destination   1 : acute suppurtive appendicitis Tissue Appendix TISSUE EXAM Antoinette Galindo MD 5/29/2024 1404        Estimated Blood Loss:   0 mL    Anesthesia Type:   General     Procedure:     The patient was seen again in the Holding Room. The risks, benefits, complications, treatment options, and expected outcomes were discussed with the patient and/or family. The possibilities of reaction to medication, pulmonary aspiration, perforation of viscus, bleeding, recurrent infection, finding a normal appendix, the need for additional procedures, failure to diagnose a condition, and creating a complication requiring transfusion or operation were discussed. There was concurrence with the proposed plan and informed consent was obtained. The site of surgery was properly noted/marked. The patient was taken to Operating Room, identified as Joyce Tai and the procedure verified as Appendectomy. A Time Out was held after the prep and draping,  and the above information confirmed.    The patient was placed in the supine position and general anesthesia was induced, along with placement of orogastric tube, Venodyne boots, and a Ortega catheter. The abdomen was prepped and draped in a sterile fashion. An infraumbilical incision was made and an open technique was used to enter the abdomen.  A port was placed and a pneumoperitoneum created.  Additional ports were placed under direct vision as needed.  A careful evaluation of the entire abdomen was carried out. The patient was placed in Trendelenburg and left lateral decubitus position. The small intestines were retracted in the cephalad and left lateral direction away from the pelvis and right lower quadrant. There was visualized an  inflamed appendix. There was no evidence of perforation. The appendix was retrocecal.     The appendix was carefully  "dissected. A window was made in the mesoappendix at the base of the appendix. Harmonic scapel and cautery were used to take the mesoappendix.  The appendix was divided at its base using an endo-JOLLY stapler, at the level of the cecum.   There was no evidence of bleeding, leakage, or complication after division of the appendix. Irrigation was also performed and irrigate suctioned from the abdomen as well.    The larger port site fascia was closed using a non-or minimally absorbable suture.  All skin incisions were closed using MonocryI suture.      Sponge count and needle count and instrument count were correct x2, and RFA wanding for sponges was also negative at the end of the procedure prior to closure.        Some portions of this record may have been generated with voice recognition software. There may be translation, syntax,  or grammatical errors. Occasional wrong word or \"sound-a-like\" substitutions may have occurred due to the inherent limitations of the voice recognition software. Read the chart carefully and recognize, using context, where substations may have occurred. If you have any questions, please contact the dictating provider for clarification or correction, as needed.     Complications: None    Condition: Stable to PACU    SIGNATURE: Antoinette Daniels MD   DATE: May 29, 2024   TIME: 2:23 PM    "

## 2024-05-29 NOTE — H&P
General Surgery  History and Physical  Joyce Tai 43 y.o. female MRN: [id number]  Unit/Bed#: ED-01 Encounter: [id number]    Assessment:  44yo F with acute appendicitis    Afebrile, vitals normal on RA  WBC 18      Plan:  - admit to general surgery service  - NPO for OR  - rocephin/flagyl  - lap appy, timing pending OR availability  - pain and nausea control      History of Present Illness   HPI:  Joyce Tai is a 43 y.o. female who presents with abdominal pain.  Nausea and abdominal pain starting this morning. Pain progressively worsened throughout the day. Was not able to eat much had no appetite. Did not vomit, but was nauseous all day. No medical history, no previous surgeries    Review of Systems   Constitutional: Negative.    HENT: Negative.     Respiratory: Negative.     Cardiovascular: Negative.    Gastrointestinal:  Positive for abdominal pain and nausea. Negative for vomiting.   Genitourinary: Negative.    Musculoskeletal: Negative.    Skin: Negative.    Neurological: Negative.    Psychiatric/Behavioral: Negative.         Historical Information   Past Medical History:   Diagnosis Date    Vertigo      Past Surgical History:   Procedure Laterality Date    SHOULDER SURGERY Bilateral      Social History   Social History     Substance and Sexual Activity   Alcohol Use Yes    Comment: rarely     Social History     Substance and Sexual Activity   Drug Use No     Social History     Tobacco Use   Smoking Status Former   Smokeless Tobacco Never     Family History: non-contributory    Meds/Allergies   all medications and allergies reviewed  No Known Allergies    Objective   First Vitals:   Blood Pressure: 116/58 (05/28/24 1619)  Pulse: 84 (05/28/24 1619)  Temperature: 98.1 °F (36.7 °C) (05/28/24 1619)  Temp Source: Oral (05/28/24 1619)  Respirations: 22 (05/28/24 1619)  Weight - Scale: 105 kg (231 lb 0.7 oz) (05/28/24 1619)  SpO2: 100 % (05/28/24 1619)    Current Vitals:   Blood Pressure: 124/58 (05/28/24  1910)  Pulse: 83 (05/28/24 1910)  Temperature: 98.1 °F (36.7 °C) (05/28/24 1619)  Temp Source: Oral (05/28/24 1619)  Respirations: 20 (05/28/24 1910)  Weight - Scale: 105 kg (231 lb 0.7 oz) (05/28/24 1619)  SpO2: 100 % (05/28/24 1910)    No intake or output data in the 24 hours ending 05/28/24 2000    Invasive Devices       Peripheral Intravenous Line  Duration             Peripheral IV 05/28/24 Right Antecubital <1 day                    Physical Exam:  General: No acute distress  Neuro: alert and oriented  HEENT: moist mucous membranes  CV: Well perfused, regular rate and rhythm  Lungs: Normal work of breathing, no increased respiratory effort  Abdomen: Soft, diffusely tender, non-distended.   Extremities: No edema, clubbing or cyanosis  Skin: Warm, dry    Lab Results: CBC:   Lab Results   Component Value Date    WBC 18.15 (H) 05/28/2024    HGB 12.3 05/28/2024    HCT 36.9 05/28/2024    MCV 82 05/28/2024     05/28/2024    RBC 4.48 05/28/2024    MCH 27.5 05/28/2024    MCHC 33.3 05/28/2024    RDW 14.7 05/28/2024    MPV 10.5 05/28/2024    NRBC 0 05/28/2024   , CMP:   Lab Results   Component Value Date    SODIUM 134 (L) 05/28/2024    K 3.5 05/28/2024     05/28/2024    CO2 23 05/28/2024    BUN 8 05/28/2024    CREATININE 0.58 (L) 05/28/2024    CALCIUM 8.8 05/28/2024    AST 18 05/28/2024    ALT 20 05/28/2024    ALKPHOS 87 05/28/2024    EGFR 113 05/28/2024     Imaging: I have personally reviewed pertinent films in PACS  EKG, Pathology, and Other Studies: I have personally reviewed pertinent films in PACS    Code Status: No Order  Advance Directive and Living Will:      Power of :    POLST:      Counseling / Coordination of Care  Total floor / unit time spent today 20 minutes. This involved direct patient contact where I performed a full history and physical, reviewed previous records, and reviewed laboratory and other diagnostic studies. Greater than 50% of total time was spent with the patient and /  or family counseling and / or coordination of care.    Rola Pierce MD  General Surgery PGY2  5/28/2024

## 2024-05-29 NOTE — TREATMENT PLAN
"I personally spent a long time in the patient's room this morning around 10 AM discussing the consent, the risks benefits and alternatives of the procedure, the necessity to wait overnight and to resuscitate her, the need for IV antibiotics and the reason for her timing of her surgery.  She was very upset because she was originally told at 9:45 AM start but the room that she was to go and was occupied by a surgeon and was not able to be freed.  We put her as soon as possible and the schedule bumping other elective cases.    Patient is threatening to juancarlos for any delay in surgery or complications from the case.  She was offered another surgeon.  This was an on-call case from the night previous.  Patient has been very challenging.  A great deal of time was spent on my part in the part of the residents and education, addressing her concerns and fears from a previous parents with a family member with appendicitis, we have addressed all these issues as best as we can.   also tried to participate in addressing the issues with the patient.      She told the resident morning \"you all want me to perforate. If I perforated, at that point  I am going juancarlos the hospital.\"  She had the experience of her son with a possible delay in diagnosis and complications.  She was also told that if she took a drink/sip of soda, she would not go to the OR that evening. (Last night) and she was insistent on having her soda.      Pain appears well-controlled as the patient drifts off to sleep between comments.      Addressed this again at the bedside in the holding area.  Patient continues to threaten to juancarlos the hospital and the surgeons and everyone involved should she have any complications.      Patient was told that there are always possibilities of complications and she wished to proceed with a different surgeon at a different hospital that she was welcome to do so and we would facilitate a transfer of care.  She agrees to proceed " with the surgery in this facility by our care.            Informed consent for procedure was personally discussed, reviewed, and signed by Dr. Galindo. Discussion by Dr. Galindo was carried out regarding risks, benefits, and alternatives with the patient.   Risks include but are not limited to:  bleeding, infection, and delayed wound healing or an open wound, pulmonary embolus, leaks from or injury to the bowel or bile ducts or other viscus or blood vessels, possible need for transfusions, and death.  If the patient was at a higher average risk due to comorbidities including but not limited to: smoking, obesity, diabetes, this was also discussed that the patient was at a higher than average risk for postoperative complications or intraoperative issues altering the course of events.    This also included the risk of mesh use during the course of the surgery if indicated, the short-term and long-term implications of using mesh including the risk of bowel fistula, multiple operations or failure of the mesh.      Discussion was also carried out regarding the need for additional procedures as indicated during the course of the surgery which may not have been explicitly discussed prior to the procedure.  Discussed in further detail the more common complications and their rates of occurrence.      A  was not  necessary.     Patient expressed understanding of the issues discussed and wished/consented to proceed.  All questions were answered by Dr. Galindo and Dr. Galindo personally reviewed this consent as dictated above.    Discussion performed between patient and the provider signing below.     Signature:   Antoinette Galindo MD  Date: 5/29/2024 Time: 1:05 PM   Signature:   Antoinette Galindo MD  Date: 5/29/2024 Time: 1:05 PM

## 2024-05-29 NOTE — DISCHARGE INSTR - AVS FIRST PAGE
[facility]  Post-Operative Care Instructions  Dr. Antoinette Galindo MD, FACS    1. General: You will feel pulling sensations around the wound or funny aches and pains around the incisions. This is normal. Even minor surgery is a change in your body and this is your body’s way of reacting to it. If you have had abdominal surgery, it may help to support the incision with a small pillow or blanket for comfort when moving or coughing.    2. Wound care: Make sure to remove the bandage in about 24 hours unless instructed otherwise. You usually don't have to redress the wound after 24-48 hours unless for comfort. Keep the incision clean and dry. Let air get to it. If this Steri-Strips fall off, just keep the wound clean.    3. Water: You may shower over the wound, unless there are drain tubes left in place. Do not bathe or use a pool or hot tub until cleared by the physician. You may shower right over the staples or Steri-Strips and pat dry when you are done.    4. Activity: You may go up and down stairs, walk as much as you are comfortable, but walk at least 3 times each day. If you have had abdominal surgery, do not lift anything heavier than 15 pounds for at least 2-4 weeks, unless cleared by the doctor.    5. Diet: You may resume a regular diet. If you had a same-day surgery or overnight stay surgery, you may wish to eat lightly for a few days: soups, crackers, and sandwiches. You may resume a regular diet when ready.    6. Medications: Resume all of your previous medications, unless told otherwise by the doctor. Avoid aspirin or ibuprofen (Advil, Motrin, etc.) products for 2-3 days after the date of surgery. You may, at that time, began to take them again. Tylenol is always fine, unless you are taking any narcotic pain medication containing Tylenol (such as Percocet, Darvocet, Vicodin, or anything containing acetaminophen). Do not take Tylenol if you're taking these medications. You do not need to take  the narcotic pain medications unless you are having significant pain and discomfort.    7. Driving: You will need someone to drive you home on the day of surgery. Do not drive or make any important decisions while on narcotic pain medication or 24 hours and after anesthesia or sedation for surgery. Generally, you may drive when your off all narcotic pain medications.    8. Upset Stomach: You may take Maalox, Tums, or similar items for an upset stomach. If your narcotic pain medication causes an upset stomach, do not take it on an empty stomach. Try taking it with at least some crackers or toast.     9. Constipation: Patients often experienced constipation after surgery. You may take over-the-counter medication for this, such as Metamucil, Senokot, Dulcolax, milk of magnesia, etc. You may take a suppository unless you have had anorectal surgery such as a procedure on your hemorrhoids. If you experience significant nausea or vomiting after abdominal surgery, call the office before trying any of these medications.    10. Call the office: If you are experiencing any of the following, fevers above 101.5°, significant nausea or vomiting, if the wound develops drainage and/or is excessive redness around the wound, or if you have significant diarrhea or other worsening symptoms.    11. Pain: You may be given a prescription for pain. This will be given to the hospital, the day of surgery.    12. Sexual Activity: You may resume sexual activity when you feel ready and comfortable and your incision is sealed and healed without apparent infection risk.    13. Urination: If you haven't urinated in 6 hours, go directly to the ER for evaluation for urinary retention.     [facility]  [address] [place] [address], [address]  [place] [address]  Phone: [phone]

## 2024-05-29 NOTE — PROGRESS NOTES
Reviewed the history and physical and the CT scan.  Consistent with acute appendicitis.  Spoke to the on-call staff nurse supervisor and anesthesia and was planning to proceed for laparoscopic appendectomy.  However after starting the process to call and the OR team for her surgery the nurse stated that the patient drank half a can of Pepsi.  This would therefore delay her surgery at least 4 hours to the middle of the night and as it was early appendicitis felt it would be safe to proceed with surgery in the morning.  Studies have shown that early appendicitis can be observed overnight with surgery the next day.  Therefore due to the risk of aspiration from her p.o. intake we will plan to proceed with surgery the next day.

## 2024-05-29 NOTE — PLAN OF CARE
Problem: PAIN - ADULT  Goal: Verbalizes/displays adequate comfort level or baseline comfort level  Description: Interventions:  - Encourage patient to monitor pain and request assistance  - Assess pain using appropriate pain scale  - Administer analgesics based on type and severity of pain and evaluate response  - Implement non-pharmacological measures as appropriate and evaluate response  - Consider cultural and social influences on pain and pain management  - Notify physician/advanced practitioner if interventions unsuccessful or patient reports new pain  Outcome: Progressing     Problem: SAFETY ADULT  Goal: Patient will remain free of falls  Description: INTERVENTIONS:  - Educate patient/family on patient safety including physical limitations  - Instruct patient to call for assistance with activity   - Consult OT/PT to assist with strengthening/mobility   - Keep Call bell within reach  - Keep bed low and locked with side rails adjusted as appropriate  - Keep care items and personal belongings within reach  - Initiate and maintain comfort rounds  - Make Fall Risk Sign visible to staff  - Apply yellow socks and bracelet for high fall risk patients  - Consider moving patient to room near nurses station  Outcome: Progressing     Problem: DISCHARGE PLANNING  Goal: Discharge to home or other facility with appropriate resources  Description: INTERVENTIONS:  - Identify barriers to discharge w/patient and caregiver  - Arrange for needed discharge resources and transportation as appropriate  - Identify discharge learning needs (meds, wound care, etc.)  - Arrange for interpretive services to assist at discharge as needed  - Refer to Case Management Department for coordinating discharge planning if the patient needs post-hospital services based on physician/advanced practitioner order or complex needs related to functional status, cognitive ability, or social support system  Outcome: Progressing     Problem: Knowledge  Deficit  Goal: Patient/family/caregiver demonstrates understanding of disease process, treatment plan, medications, and discharge instructions  Description: Complete learning assessment and assess knowledge base.  Interventions:  - Provide teaching at level of understanding  - Provide teaching via preferred learning methods  Outcome: Progressing     Problem: GASTROINTESTINAL - ADULT  Goal: Minimal or absence of nausea and/or vomiting  Description: INTERVENTIONS:  - Administer IV fluids if ordered to ensure adequate hydration  - Maintain NPO status until nausea and vomiting are resolved  - Nasogastric tube if ordered  - Administer ordered antiemetic medications as needed  - Provide nonpharmacologic comfort measures as appropriate  - Advance diet as tolerated, if ordered  - Consider nutrition services referral to assist patient with adequate nutrition and appropriate food choices  Outcome: Progressing     Problem: METABOLIC, FLUID AND ELECTROLYTES - ADULT  Goal: Electrolytes maintained within normal limits  Description: INTERVENTIONS:  - Monitor labs and assess patient for signs and symptoms of electrolyte imbalances  - Administer electrolyte replacement as ordered  - Monitor response to electrolyte replacements, including repeat lab results as appropriate  - Instruct patient on fluid and nutrition as appropriate  Outcome: Progressing  Goal: Fluid balance maintained  Description: INTERVENTIONS:  - Monitor labs   - Monitor I/O and WT  - Instruct patient on fluid and nutrition as appropriate  - Assess for signs & symptoms of volume excess or deficit  Outcome: Progressing

## 2024-05-29 NOTE — PLAN OF CARE
Problem: PAIN - ADULT  Goal: Verbalizes/displays adequate comfort level or baseline comfort level  Description: Interventions:  - Encourage patient to monitor pain and request assistance  - Assess pain using appropriate pain scale  - Administer analgesics based on type and severity of pain and evaluate response  - Implement non-pharmacological measures as appropriate and evaluate response  - Consider cultural and social influences on pain and pain management  - Notify physician/advanced practitioner if interventions unsuccessful or patient reports new pain  Outcome: Adequate for Discharge     Problem: SAFETY ADULT  Goal: Patient will remain free of falls  Description: INTERVENTIONS:  - Educate patient/family on patient safety including physical limitations  - Instruct patient to call for assistance with activity   - Consult OT/PT to assist with strengthening/mobility   - Keep Call bell within reach  - Keep bed low and locked with side rails adjusted as appropriate  - Keep care items and personal belongings within reach  - Initiate and maintain comfort rounds  - Make Fall Risk Sign visible to staff  - Apply yellow socks and bracelet for high fall risk patients  - Consider moving patient to room near nurses station  Outcome: Adequate for Discharge     Problem: DISCHARGE PLANNING  Goal: Discharge to home or other facility with appropriate resources  Description: INTERVENTIONS:  - Identify barriers to discharge w/patient and caregiver  - Arrange for needed discharge resources and transportation as appropriate  - Identify discharge learning needs (meds, wound care, etc.)  - Arrange for interpretive services to assist at discharge as needed  - Refer to Case Management Department for coordinating discharge planning if the patient needs post-hospital services based on physician/advanced practitioner order or complex needs related to functional status, cognitive ability, or social support system  Outcome: Adequate for  Discharge     Problem: Knowledge Deficit  Goal: Patient/family/caregiver demonstrates understanding of disease process, treatment plan, medications, and discharge instructions  Description: Complete learning assessment and assess knowledge base.  Interventions:  - Provide teaching at level of understanding  - Provide teaching via preferred learning methods  Outcome: Adequate for Discharge     Problem: GASTROINTESTINAL - ADULT  Goal: Minimal or absence of nausea and/or vomiting  Description: INTERVENTIONS:  - Administer IV fluids if ordered to ensure adequate hydration  - Maintain NPO status until nausea and vomiting are resolved  - Nasogastric tube if ordered  - Administer ordered antiemetic medications as needed  - Provide nonpharmacologic comfort measures as appropriate  - Advance diet as tolerated, if ordered  - Consider nutrition services referral to assist patient with adequate nutrition and appropriate food choices  Outcome: Adequate for Discharge     Problem: METABOLIC, FLUID AND ELECTROLYTES - ADULT  Goal: Electrolytes maintained within normal limits  Description: INTERVENTIONS:  - Monitor labs and assess patient for signs and symptoms of electrolyte imbalances  - Administer electrolyte replacement as ordered  - Monitor response to electrolyte replacements, including repeat lab results as appropriate  - Instruct patient on fluid and nutrition as appropriate  Outcome: Adequate for Discharge  Goal: Fluid balance maintained  Description: INTERVENTIONS:  - Monitor labs   - Monitor I/O and WT  - Instruct patient on fluid and nutrition as appropriate  - Assess for signs & symptoms of volume excess or deficit  Outcome: Adequate for Discharge

## 2024-05-29 NOTE — PLAN OF CARE
Problem: PAIN - ADULT  Goal: Verbalizes/displays adequate comfort level or baseline comfort level  Description: Interventions:  - Encourage patient to monitor pain and request assistance  - Assess pain using appropriate pain scale  - Administer analgesics based on type and severity of pain and evaluate response  - Implement non-pharmacological measures as appropriate and evaluate response  - Consider cultural and social influences on pain and pain management  - Notify physician/advanced practitioner if interventions unsuccessful or patient reports new pain  Outcome: Progressing     Problem: SAFETY ADULT  Goal: Patient will remain free of falls  Description: INTERVENTIONS:  - Educate patient/family on patient safety including physical limitations  - Instruct patient to call for assistance with activity   - Consult OT/PT to assist with strengthening/mobility   - Keep Call bell within reach  - Keep bed low and locked with side rails adjusted as appropriate  - Keep care items and personal belongings within reach  - Initiate and maintain comfort rounds  - Make Fall Risk Sign visible to staff  - Apply yellow socks and bracelet for high fall risk patients  - Consider moving patient to room near nurses station  Outcome: Progressing     Problem: DISCHARGE PLANNING  Goal: Discharge to home or other facility with appropriate resources  Description: INTERVENTIONS:  - Identify barriers to discharge w/patient and caregiver  - Arrange for needed discharge resources and transportation as appropriate  - Identify discharge learning needs (meds, wound care, etc.)  - Arrange for interpretive services to assist at discharge as needed  - Refer to Case Management Department for coordinating discharge planning if the patient needs post-hospital services based on physician/advanced practitioner order or complex needs related to functional status, cognitive ability, or social support system  Outcome: Progressing     Problem: Knowledge  Deficit  Goal: Patient/family/caregiver demonstrates understanding of disease process, treatment plan, medications, and discharge instructions  Description: Complete learning assessment and assess knowledge base.  Interventions:  - Provide teaching at level of understanding  - Provide teaching via preferred learning methods  Outcome: Progressing     Problem: GASTROINTESTINAL - ADULT  Goal: Minimal or absence of nausea and/or vomiting  Description: INTERVENTIONS:  - Administer IV fluids if ordered to ensure adequate hydration  - Maintain NPO status until nausea and vomiting are resolved  - Nasogastric tube if ordered  - Administer ordered antiemetic medications as needed  - Provide nonpharmacologic comfort measures as appropriate  - Advance diet as tolerated, if ordered  - Consider nutrition services referral to assist patient with adequate nutrition and appropriate food choices  Outcome: Progressing

## 2024-05-29 NOTE — UTILIZATION REVIEW
Initial Clinical Review    Admission: Date/Time/Statement:   Admission Orders (From admission, onward)       Ordered        05/28/24 2044  INPATIENT ADMISSION  Once                          Orders Placed This Encounter   Procedures    INPATIENT ADMISSION     Standing Status:   Standing     Number of Occurrences:   1     Order Specific Question:   Level of Care     Answer:   Med Surg [16]     Order Specific Question:   Estimated length of stay     Answer:   More than 2 Midnights     Order Specific Question:   Certification     Answer:   I certify that inpatient services are medically necessary for this patient for a duration of greater than two midnights. See H&P and MD Progress Notes for additional information about the patient's course of treatment.     ED Arrival Information       Expected   -    Arrival   5/28/2024 16:11    Acuity   Emergent              Means of arrival   Walk-In    Escorted by   Self    Service   Surgery-General    Admission type   Emergency              Arrival complaint   Right abdominal pain             Chief Complaint   Patient presents with    Abdominal Pain     Pt woke up w RLQ abd pain, cramps, nausea, worse w movement, still has appendix        Initial Presentation: 43 y.o. female presents to the ED from home with c/o Nausea, progressively worsening abd pain starting today.  Decreased appetite. PMH: none.  In the ED VS stable.  Labs - leukocytosis, low NA, abnormal UA.  Imaging - acute appendicitis.  Treated with IV fluids, IV Toradol, Zofran, IV analgesia x 4, IV antibiotics.  On exam Soft, diffusely tender, non-distended.  Admitted to INPATIENT status with Acute appendicitis  - surgery admit, NPO for OR - lap appy, Iv antibiotics, PRN analgesia, Antiemetics.      Date: 5/29   Day 2:   Pt with pain overnight, adjustments to analgesia regimen.  WBC trending downward.  For OR today, NPO.      +++++++++++++++  5/29 OPERATIVE NOTE  Pre operative diagnosis:  Acute appendicitis [K35.80]      Operative Findings:  Acute suppurative appendicitis      Post operative diagnosis:   Post-Op Diagnosis Codes:     * Acute appendicitis [K35.80]     Procedure:   Procedure(s):  APPENDECTOMY LAPAROSCOPIC      Anesthesia Type:  General   +++++++++++++++      ED Triage Vitals   Temperature Pulse Respirations Blood Pressure SpO2   05/28/24 1619 05/28/24 1619 05/28/24 1619 05/28/24 1619 05/28/24 1619   98.1 °F (36.7 °C) 84 22 116/58 100 %      Temp Source Heart Rate Source Patient Position - Orthostatic VS BP Location FiO2 (%)   05/28/24 1619 05/28/24 1619 05/28/24 1619 05/28/24 1619 --   Oral Monitor Sitting Right arm       Pain Score       05/28/24 1653       10 - Worst Possible Pain          Wt Readings from Last 1 Encounters:   05/29/24 105 kg (231 lb 0.7 oz)     Additional Vital Signs:   Date/Time Temp Pulse Resp BP MAP (mmHg) SpO2 O2 Device Patient Position - Orthostatic VS   05/29/24 07:06:51 99.9 °F (37.7 °C) 92 16 127/73 91 91 % -- --   05/28/24 22:06:59 99.3 °F (37.4 °C) 91 -- 128/73 91 95 % -- --   05/28/24 2201 99.3 °F (37.4 °C) 91 16 128/73 -- -- -- Lying   05/28/24 2148 -- 90 16 126/73 -- 96 % None (Room air) Lying   05/28/24 1910 -- 83 20 124/58 83 100 % None (Room air) Lying   05/28/24 1721 -- 81 -- 123/67 -- 100 % None (Room air) Lying       Pertinent Labs/Diagnostic Test Results:     CT abdomen pelvis w contrast   Final Result by Yovani Mireles MD (05/28 2044)      Findings of acute appendicitis.         Results from last 7 days   Lab Units 05/29/24  0541 05/28/24  1652   WBC Thousand/uL 15.11* 18.15*   HEMOGLOBIN g/dL 11.1* 12.3   HEMATOCRIT % 34.5* 36.9   PLATELETS Thousands/uL 279 322   TOTAL NEUT ABS Thousands/µL 12.42* 15.19*         Results from last 7 days   Lab Units 05/28/24  1652   SODIUM mmol/L 134*   POTASSIUM mmol/L 3.5   CHLORIDE mmol/L 103   CO2 mmol/L 23   ANION GAP mmol/L 8   BUN mg/dL 8   CREATININE mg/dL 0.58*   EGFR ml/min/1.73sq m 113   CALCIUM mg/dL 8.8     Results from  last 7 days   Lab Units 05/28/24  1652   AST U/L 18   ALT U/L 20   ALK PHOS U/L 87   TOTAL PROTEIN g/dL 7.8   ALBUMIN g/dL 4.2   TOTAL BILIRUBIN mg/dL 0.34         Results from last 7 days   Lab Units 05/28/24  1652   GLUCOSE RANDOM mg/dL 89     Results from last 7 days   Lab Units 05/28/24  1652   LIPASE u/L 22     Results from last 7 days   Lab Units 05/28/24  1951   CLARITY UA  Turbid   COLOR UA  Brown   SPEC GRAV UA  1.012   PH UA  6.5   GLUCOSE UA mg/dl Negative   KETONES UA mg/dl Negative   BLOOD UA  Large*   PROTEIN UA mg/dl 30 (1+)*   NITRITE UA  Negative   BILIRUBIN UA  Negative   UROBILINOGEN UA (BE) mg/dl <2.0   LEUKOCYTES UA  Large*   WBC UA /hpf Innumerable*   RBC UA /hpf Innumerable*   BACTERIA UA /hpf Occasional   EPITHELIAL CELLS WET PREP /hpf Occasional   MUCUS THREADS  Moderate*     ED Treatment:   Medication Administration from 05/28/2024 1611 to 05/28/2024 2201         Date/Time Order Dose Route Action     05/28/2024 1658 EDT sodium chloride 0.9 % bolus 1,000 mL 1,000 mL Intravenous New Bag     05/28/2024 1659 EDT ondansetron (ZOFRAN) injection 4 mg 4 mg Intravenous Given     05/28/2024 1653 EDT ketorolac (TORADOL) injection 15 mg 15 mg Intravenous Given     05/28/2024 1735 EDT morphine injection 4 mg 4 mg Intravenous Given     05/28/2024 1946 EDT morphine injection 4 mg 4 mg Intravenous Given     05/28/2024 2038 EDT HYDROmorphone (DILAUDID) injection 1 mg 1 mg Intravenous Given     05/28/2024 2033 EDT iohexol (OMNIPAQUE) 350 MG/ML injection (MULTI-DOSE) 100 mL 100 mL Intravenous Given     05/28/2024 2125 EDT metroNIDAZOLE (FLAGYL) IVPB (premix) 500 mg 100 mL 500 mg Intravenous New Bag     05/28/2024 2122 EDT lactated ringers infusion 125 mL/hr Intravenous New Bag          Past Medical History:   Diagnosis Date    Vertigo      Admitting Diagnosis: Abdominal pain [R10.9]  Acute appendicitis [K35.80]  Age/Sex: 43 y.o. female  Admission Orders:  Scheduled Medications:  cefTRIAXone, 1,000 mg,  Intravenous, Q24H  escitalopram, 20 mg, Oral, Daily  heparin (porcine), 5,000 Units, Subcutaneous, Q8H LEATHA  metroNIDAZOLE, 500 mg, Intravenous, Q8H      Continuous IV Infusions:  lactated ringers, 125 mL/hr, Intravenous, Continuous      PRN Meds:  acetaminophen, 975 mg, Oral, Q6H PRN - x 1 5/29  ALPRAZolam, 0.25 mg, Oral, BID PRN  HYDROmorphone, 1 mg, Intravenous, Q4H PRN - x 1 5/29  HYDROmorphone, 0.5 mg, Intravenous, Q3H PRN - x 1 5/28, x 2 5/29 and d/c   HYDROmorphone, 1 mg, Intravenous, Q3H PRN - x 1  5/29   ondansetron, 4 mg, Intravenous, Q6H PRN    Urine culture  Analgesia  Ambulate  5/29 NPO w/ sips       Network Utilization Review Department  ATTENTION: Please call with any questions or concerns to [phone] and carefully listen to the prompts so that you are directed to the right person. All voicemails are confidential.   For Discharge needs, contact Care Management DC Support Team at [phone] opt. 2  Send all requests for admission clinical reviews, approved or denied determinations and any other requests to dedicated fax number below belonging to the campus where the patient is receiving treatment. List of dedicated fax numbers for the Facilities:  FACILITY NAME UR FAX NUMBER   ADMISSION DENIALS (Administrative/Medical Necessity) [phone]   DISCHARGE SUPPORT TEAM (NETWORK) [phone]   PARENT CHILD HEALTH (Maternity/NICU/Pediatrics) [phone]   [facility] [phone]   [facility] [phone]   [facility] [phone]   [facility] [phone]   [facility] [phone]   [facility] [phone]   [facility] [phone]   [facility] [phone]   [facility] [phone]   [facility] [phone]   [facility]  [facility] [phone]   [facility] [phone]

## 2024-05-29 NOTE — ANESTHESIA POSTPROCEDURE EVALUATION
Post-Op Assessment Note    CV Status:  Stable  Pain Score: 0    Pain management: adequate       Mental Status:  Somnolent   Hydration Status:  Euvolemic and stable   PONV Controlled:  None   Airway Patency:  Patent     Post Op Vitals Reviewed: Yes    No anethesia notable event occurred.    Staff: EBENEZER               /66 (05/29/24 1437)    Temp 97.9 °F (36.6 °C) (05/29/24 1437)    Pulse 81 (05/29/24 1437)   Resp 13 (05/29/24 1437)    SpO2 100 % (05/29/24 1437)

## 2024-05-29 NOTE — QUICK NOTE
"Changed pain regimen overnight at the patient's request as she did not want to take PO oxy and stated IV dilaudid was more helpful with pain control. This morning, I entered the patient's room as she was already screaming. It was very difficult to console her and she was not in a mental state to engage in a reasonable discussion as she was fixated on going to surgery immediately. She refused her PRN home xanax that was available to her and kept demanding surgery. I tried to explain that we are trying our best to work with the OR availability and staffing to schedule her surgery for as soon as possible, but she demanded to speak with \"someone in charge.\" She did not want to speak to the hospital supervisor because \"she is a nurse I want to talk to someone in charge.\" I offered some solutions to help make her more comfortable in the meantime while we were in discussion to schedule her case asap. She declined everything I offered and the only option that was acceptable to her was surgery. I told her that I would order IV ativan and add more IV dilaudid and she has those available to her while I try to figure out timing for her. I immediately discussed this with the hospital supervisor and explained the situation. She in turn discussed this with the OR and they graciously found time to schedule her for the morning. This plan was confirmed with the attending. As I was returning to see the patient, I was informed she accepted the ativan and dilaudid and was now resting comfortably.   "

## 2024-05-29 NOTE — NURSING NOTE
Pt discharged to home, in stable condition. Discharge instructions reviewed with pt. Verbalized understanding and able to repeat back information. Taken via wheelchair to car by PCA. Spouse with pt, he is transporting her home.

## 2024-05-30 LAB — BACTERIA UR CULT: NORMAL

## 2024-05-30 NOTE — PROGRESS NOTES
Assessment/Plan:   Joyce Tai is a 43 y.o.female who comes in today for postoperative check after laparoscopic appendectomy 5/29/24 with Dr. Galindo.     Pathology: Reviewed with patient, all questions answered.   Final Diagnosis   A. Appendix, Laparoscopic Appendectomy:  - Acute appendicitis and periappendicitis with focal gangrenous changes.     44 yo colon cancer screening in two years    Postoperative restrictions reviewed. All questions answered.       ______________________________________________________  HPI:  Joyce Tai is a 43 y.o.female who comes in today for postoperative check after recent  laproscopic appendectomy 5/29/24 with Dr. Galindo.     Currently doing well without problems, no fever or chills,no nausea and no vomiting.    Reports doing well with minimal pain.    ROS:  General ROS: negative for - chills, fatigue, fever or night sweats, weight loss  Respiratory ROS: no cough, shortness of breath, or wheezing  Cardiovascular ROS: no chest pain or dyspnea on exertion  Genito-Urinary ROS: no dysuria, trouble voiding, or hematuria  Musculoskeletal ROS: negative for - gait disturbance, joint pain or muscle pain  Neurological ROS: no TIA or stroke symptoms  GI ROS: see HPI  Skin ROS: no new rashes or lesions   Lymphatic ROS: no new adenopathy noted by pt.   GYN ROS: see HPI, no new GYN history or bleeding noted  Psy ROS: no new mental or behavioral disturbances         Patient Active Problem List   Diagnosis    Eczema of right hand    Adult BMI 36.0-36.9 kg/sq m    Anxiety state    Panic disorder       Allergies:  Patient has no known allergies.      Current Outpatient Medications:     ALPRAZolam (XANAX) 0.25 mg tablet, 1 po bid prn Anxiety, Disp: , Rfl:     escitalopram (LEXAPRO) 20 mg tablet, Take 20 mg by mouth, Disp: , Rfl:     hydrOXYzine HCL (ATARAX) 10 mg tablet, Take 10-20 mg by mouth daily as needed, Disp: , Rfl:     ibuprofen (MOTRIN) 600 mg tablet, , Disp: , Rfl:     Past Medical  "History:   Diagnosis Date    Vertigo        Past Surgical History:   Procedure Laterality Date    APPENDECTOMY LAPAROSCOPIC N/A 5/29/2024    Procedure: APPENDECTOMY LAPAROSCOPIC;  Surgeon: Antoinette Galindo MD;  Location: [facility] OR;  Service: General    SHOULDER SURGERY Bilateral        Family History   Problem Relation Age of Onset    Hyperlipidemia Mother     Thyroid disease Mother     No Known Problems Father     No Known Problems Sister     No Known Problems Sister     No Known Problems Brother     No Known Problems Daughter     Leukemia Son 2    No Known Problems Maternal Grandmother     No Known Problems Maternal Grandfather     No Known Problems Paternal Grandmother     No Known Problems Paternal Grandfather     Breast cancer Maternal Aunt 58    No Known Problems Maternal Aunt     No Known Problems Maternal Aunt     No Known Problems Maternal Aunt     No Known Problems Maternal Aunt     No Known Problems Maternal Aunt     No Known Problems Paternal Aunt     No Known Problems Paternal Aunt     Colon cancer Neg Hx     Ovarian cancer Neg Hx         reports that she has quit smoking. She has never been exposed to tobacco smoke. She has never used smokeless tobacco. She reports current alcohol use. She reports that she does not use drugs.    Vitals:    06/06/24 1528   BP: 104/76   Pulse: 84   Resp: 16   Temp: 98 °F (36.7 °C)   SpO2: 97%       PHYSICAL EXAM  General: normal, cooperative, no distress  Abdominal: soft, nondistended, or nontender  Incision: clean, dry, and intact and healing well    Some portions of this record may have been generated with voice recognition software. There may be translation, syntax,  or grammatical errors. Occasional wrong word or \"sound-a-like\" substitutions may have occurred due to the inherent limitations of the voice recognition software. Read the chart carefully and recognize, using context, where substitutions may have occurred. If you have any questions, please contact the " dictating provider for clarification or correction, as needed. This encounter has been coded by a non-certified coder.       Corina Orozco PA-C    Date: 6/6/2024 Time: 3:37 PM

## 2024-05-30 NOTE — UTILIZATION REVIEW
Notification of Unplanned, Urgent, or   Emergency Inpatient Admission   AUTHORIZATION REQUEST   [place] [address]  Tax ID: [id number]  NPI: [id number]  [facility] of Service: [facility]  Admission Level of Care: Inpatient  Place of Service Code: 21     Attending Physician Information  Attending Name and NPI#: Pillo Wray Md [[id number]]  Phone: [phone]     Admission Information  Inpatient Admission Date/Time: 5/28/24  8:44 PM  Discharge Date/Time: 5/29/2024  7:43 PM  Admitting Diagnosis Code/Description:  Abdominal pain [R10.9]  Acute appendicitis [K35.80]     Utilization Review Contact  Yoselin Espinoza Utilization   Phone: [phone]  Fax: [phone]  Email: [email or web address]  Contact for approvals/pending authorizations, clinical reviews, and discharge.     Physician Advisory Services Contact  Medical Necessity Denial & [id number] Discussion  Phone: [phone]  Fax: [phone]  Email: [email or web address]     DISCHARGE SUPPORT TEAM:  For Patients Discharge Needs & Updates  Phone: [phone] opt. 2 Fax: [phone]  Email: [email or web address]

## 2024-05-30 NOTE — UTILIZATION REVIEW
NOTIFICATION OF ADMISSION DISCHARGE   This is a Notification of Discharge from [facility]. Please be advised that this patient has been discharge from our facility. Below you will find the admission and discharge date and time including the patient’s disposition.   UTILIZATION REVIEW CONTACT:  Yoselin Espinoza  Utilization   Network Utilization Review Department  Phone: [phone] x carefully listen to the prompts. All voicemails are confidential.  Email: [email or web address]     ADMISSION INFORMATION  PRESENTATION DATE: 5/28/2024  4:21 PM  OBERVATION ADMISSION DATE:   INPATIENT ADMISSION DATE: 5/28/24  8:44 PM   DISCHARGE DATE: 5/29/2024  7:43 PM   DISPOSITION:Home/Self Care    Network Utilization Review Department  ATTENTION: Please call with any questions or concerns to [phone] and carefully listen to the prompts so that you are directed to the right person. All voicemails are confidential.   For Discharge needs, contact Care Management DC Support Team at [phone] opt. 2  Send all requests for admission clinical reviews, approved or denied determinations and any other requests to dedicated fax number below belonging to the campus where the patient is receiving treatment. List of dedicated fax numbers for the Facilities:  FACILITY NAME UR FAX NUMBER   ADMISSION DENIALS (Administrative/Medical Necessity) [phone]   DISCHARGE SUPPORT TEAM ([facility]) [phone]   PARENT CHILD HEALTH (Maternity/NICU/Pediatrics) [phone]   [facility] [phone]   [facility] [phone]   [facility] [phone]   [facility] [phone]   [facility] [phone]   [facility] [phone]   [facility] [phone]   [facility] [phone]   [facility]  [facility] [phone]   [facility] [phone]   [facility] [phone]   [facility] [phone]

## 2024-05-31 ENCOUNTER — TELEPHONE (OUTPATIENT)
Age: 44
End: 2024-05-31

## 2024-05-31 PROCEDURE — 88304 TISSUE EXAM BY PATHOLOGIST: CPT | Performed by: PATHOLOGY

## 2024-05-31 NOTE — TELEPHONE ENCOUNTER
Pt called to get a note for her  for 05/29 and 05/30 because he was with her for her surgery. Her husbands name is Meenakshi Tai. Please upload the note into her MyChart. Please call her to let her know when completed or if you have any questions

## 2024-06-04 ENCOUNTER — TELEPHONE (OUTPATIENT)
Dept: SURGERY | Facility: CLINIC | Age: 44
End: 2024-06-04

## 2024-06-04 NOTE — TELEPHONE ENCOUNTER
----- Message from Corina Orozco PA-C sent at 6/3/2024 11:15 AM EDT -----  Please let her know her pathology shows acute appendicitis no malignancy

## 2024-06-04 NOTE — TELEPHONE ENCOUNTER
Spoke with patient states she is feeling fine. Advised of results and confirmed her follow up for 6/6/24

## 2024-06-06 ENCOUNTER — OFFICE VISIT (OUTPATIENT)
Dept: SURGERY | Facility: CLINIC | Age: 44
End: 2024-06-06

## 2024-06-06 VITALS
HEART RATE: 84 BPM | BODY MASS INDEX: 43.23 KG/M2 | OXYGEN SATURATION: 97 % | HEIGHT: 61 IN | SYSTOLIC BLOOD PRESSURE: 104 MMHG | DIASTOLIC BLOOD PRESSURE: 76 MMHG | WEIGHT: 229 LBS | TEMPERATURE: 98 F | RESPIRATION RATE: 16 BRPM

## 2024-06-06 DIAGNOSIS — Z90.49 S/P LAPAROSCOPIC APPENDECTOMY: Primary | ICD-10-CM

## 2024-06-06 PROCEDURE — 99024 POSTOP FOLLOW-UP VISIT: CPT | Performed by: PHYSICIAN ASSISTANT

## 2024-06-06 RX ORDER — HYDROXYZINE HYDROCHLORIDE 10 MG/1
10-20 TABLET, FILM COATED ORAL DAILY PRN
COMMUNITY
Start: 2024-05-09

## 2024-10-08 ENCOUNTER — HOSPITAL ENCOUNTER (OUTPATIENT)
Dept: MAMMOGRAPHY | Facility: MEDICAL CENTER | Age: 44
Discharge: HOME | End: 2024-10-08
Payer: COMMERCIAL

## 2024-10-08 VITALS — WEIGHT: 229.28 LBS | BODY MASS INDEX: 43.29 KG/M2 | HEIGHT: 61 IN

## 2024-10-08 DIAGNOSIS — Z12.31 BREAST CANCER SCREENING BY MAMMOGRAM: ICD-10-CM

## 2024-10-08 PROCEDURE — 77063 BREAST TOMOSYNTHESIS BI: CPT

## 2024-10-08 PROCEDURE — 77067 SCR MAMMO BI INCL CAD: CPT

## (undated) DEVICE — TELFA NON-ADHERENT ABSORBENT DRESSING: Brand: TELFA

## (undated) DEVICE — ETS45 RELOAD STANDARD 45MM: Brand: ENDOPATH

## (undated) DEVICE — PENCIL ELECTROSURG E-Z CLEAN -0035H

## (undated) DEVICE — NEEDLE HYPO 22G X 1-1/2 IN

## (undated) DEVICE — ENDOPATH ETS-FLEX45 ARTICULATING ENDOSCOPIC LINEAR CUTTER, NO RELOAD: Brand: ENDOPATH

## (undated) DEVICE — DRESSING MEPILEX AG 4 X 5 IN

## (undated) DEVICE — GLOVE INDICATOR PI UNDERGLOVE SZ 6.5 BLUE

## (undated) DEVICE — TROCAR: Brand: KII FIOS FIRST ENTRY

## (undated) DEVICE — DRAPE EQUIPMENT RF WAND

## (undated) DEVICE — ALLENTOWN LAP CHOLE APP PACK: Brand: CARDINAL HEALTH

## (undated) DEVICE — HARMONIC ACE 5MM DIAMETER SHEARS 36CM SHAFT LENGTH + ADAPTIVE TISSUE TECHNOLOGY FOR USE WITH GENERATOR G11: Brand: HARMONIC ACE

## (undated) DEVICE — GLOVE SRG BIOGEL 6.5

## (undated) DEVICE — 3M™ STERI-STRIP™ REINFORCED ADHESIVE SKIN CLOSURES, R1547, 1/2 IN X 4 IN (12 MM X 100 MM), 6 STRIPS/ENVELOPE: Brand: 3M™ STERI-STRIP™

## (undated) DEVICE — TISSUE RETRIEVAL SYSTEM: Brand: INZII RETRIEVAL SYSTEM

## (undated) DEVICE — BLUE HEAT SCOPE WARMER

## (undated) DEVICE — 3000CC GUARDIAN II: Brand: GUARDIAN

## (undated) DEVICE — SCD SEQUENTIAL COMPRESSION COMFORT SLEEVE MEDIUM KNEE LENGTH: Brand: KENDALL SCD

## (undated) DEVICE — DRESSING TELFA 2 X 3 IN STRL

## (undated) DEVICE — TRAY FOLEY 16FR URIMETER SURESTEP

## (undated) DEVICE — LAPAROSCOPIC SMOKE EVAC TUBING

## (undated) DEVICE — 2963 MEDIPORE SOFT CLOTH TAPE 3 IN X 10 YD 12 RLS/CS: Brand: 3M™ MEDIPORE™

## (undated) DEVICE — [HIGH FLOW INSUFFLATOR,  DO NOT USE IF PACKAGE IS DAMAGED,  KEEP DRY,  KEEP AWAY FROM SUNLIGHT,  PROTECT FROM HEAT AND RADIOACTIVE SOURCES.]: Brand: PNEUMOSURE

## (undated) DEVICE — CHLORAPREP HI-LITE 26ML ORANGE

## (undated) DEVICE — MICRO HVTSA, 0.5G AND HVTSA SOURCEMARK PRODUCT CODE M1206 AND M1206-01: Brand: EXOFIN MICRO HVTSA, 0.5G

## (undated) DEVICE — TUBING SMOKE EVAC W/FILTRATION DEVICE PLUMEPORT ACTIV

## (undated) DEVICE — INTENDED FOR TISSUE SEPARATION, AND OTHER PROCEDURES THAT REQUIRE A SHARP SURGICAL BLADE TO PUNCTURE OR CUT.: Brand: BARD-PARKER SAFETY BLADES SIZE 15, STERILE

## (undated) DEVICE — SUT MONOCRYL 4-0 PS-2 27 IN Y426H

## (undated) DEVICE — TROCAR: Brand: KII® SLEEVE